# Patient Record
Sex: MALE | Race: OTHER | Employment: FULL TIME | URBAN - METROPOLITAN AREA
[De-identification: names, ages, dates, MRNs, and addresses within clinical notes are randomized per-mention and may not be internally consistent; named-entity substitution may affect disease eponyms.]

---

## 2021-10-25 ENCOUNTER — HOSPITAL ENCOUNTER (INPATIENT)
Facility: HOSPITAL | Age: 61
LOS: 46 days | Discharge: HOME | DRG: 492 | End: 2021-12-10
Attending: EMERGENCY MEDICINE | Admitting: HOSPITALIST
Payer: OTHER MISCELLANEOUS

## 2021-10-25 ENCOUNTER — ANESTHESIA EVENT (OUTPATIENT)
Dept: SURGERY | Facility: HOSPITAL | Age: 61
DRG: 492 | End: 2021-10-25
Payer: OTHER MISCELLANEOUS

## 2021-10-25 ENCOUNTER — APPOINTMENT (OUTPATIENT)
Dept: GENERAL RADIOLOGY | Facility: HOSPITAL | Age: 61
DRG: 492 | End: 2021-10-25
Attending: EMERGENCY MEDICINE
Payer: OTHER MISCELLANEOUS

## 2021-10-25 ENCOUNTER — ANESTHESIA (OUTPATIENT)
Dept: SURGERY | Facility: HOSPITAL | Age: 61
DRG: 492 | End: 2021-10-25
Payer: OTHER MISCELLANEOUS

## 2021-10-25 ENCOUNTER — APPOINTMENT (OUTPATIENT)
Dept: GENERAL RADIOLOGY | Facility: HOSPITAL | Age: 61
DRG: 492 | End: 2021-10-25
Attending: ORTHOPAEDIC SURGERY
Payer: OTHER MISCELLANEOUS

## 2021-10-25 DIAGNOSIS — S82.842B TYPE I OR II OPEN BIMALLEOLAR FRACTURE OF LEFT ANKLE, INITIAL ENCOUNTER: Primary | ICD-10-CM

## 2021-10-25 DIAGNOSIS — S82.841B: ICD-10-CM

## 2021-10-25 PROBLEM — S82.899B OPEN ANKLE FRACTURE: Status: ACTIVE | Noted: 2021-10-25

## 2021-10-25 PROCEDURE — 71045 X-RAY EXAM CHEST 1 VIEW: CPT | Performed by: EMERGENCY MEDICINE

## 2021-10-25 PROCEDURE — 0QSJ04Z REPOSITION RIGHT FIBULA WITH INTERNAL FIXATION DEVICE, OPEN APPROACH: ICD-10-PCS | Performed by: ORTHOPAEDIC SURGERY

## 2021-10-25 PROCEDURE — 99222 1ST HOSP IP/OBS MODERATE 55: CPT | Performed by: HOSPITALIST

## 2021-10-25 PROCEDURE — B41F1ZZ FLUOROSCOPY OF RIGHT LOWER EXTREMITY ARTERIES USING LOW OSMOLAR CONTRAST: ICD-10-PCS | Performed by: ORTHOPAEDIC SURGERY

## 2021-10-25 PROCEDURE — 73600 X-RAY EXAM OF ANKLE: CPT | Performed by: EMERGENCY MEDICINE

## 2021-10-25 PROCEDURE — 76000 FLUOROSCOPY <1 HR PHYS/QHP: CPT | Performed by: ORTHOPAEDIC SURGERY

## 2021-10-25 PROCEDURE — 73610 X-RAY EXAM OF ANKLE: CPT | Performed by: EMERGENCY MEDICINE

## 2021-10-25 DEVICE — IMPLANTABLE DEVICE: Type: IMPLANTABLE DEVICE | Site: ANKLE | Status: FUNCTIONAL

## 2021-10-25 DEVICE — ROD CARBON 11X400 394.87: Type: IMPLANTABLE DEVICE | Site: ANKLE | Status: FUNCTIONAL

## 2021-10-25 DEVICE — CLAMP SYNT LG MRI 390.005: Type: IMPLANTABLE DEVICE | Site: ANKLE | Status: FUNCTIONAL

## 2021-10-25 RX ORDER — EPHEDRINE SULFATE 50 MG/ML
INJECTION, SOLUTION INTRAVENOUS AS NEEDED
Status: DISCONTINUED | OUTPATIENT
Start: 2021-10-25 | End: 2021-10-25 | Stop reason: SURG

## 2021-10-25 RX ORDER — PROCHLORPERAZINE EDISYLATE 5 MG/ML
10 INJECTION INTRAMUSCULAR; INTRAVENOUS EVERY 6 HOURS PRN
Status: ACTIVE | OUTPATIENT
Start: 2021-10-25 | End: 2021-10-27

## 2021-10-25 RX ORDER — POLYETHYLENE GLYCOL 3350 17 G/17G
17 POWDER, FOR SOLUTION ORAL DAILY PRN
Status: DISCONTINUED | OUTPATIENT
Start: 2021-10-25 | End: 2021-12-10

## 2021-10-25 RX ORDER — CEFAZOLIN SODIUM/WATER 2 G/20 ML
2 SYRINGE (ML) INTRAVENOUS EVERY 8 HOURS
Status: COMPLETED | OUTPATIENT
Start: 2021-10-26 | End: 2021-10-26

## 2021-10-25 RX ORDER — SODIUM CHLORIDE, SODIUM LACTATE, POTASSIUM CHLORIDE, CALCIUM CHLORIDE 600; 310; 30; 20 MG/100ML; MG/100ML; MG/100ML; MG/100ML
INJECTION, SOLUTION INTRAVENOUS CONTINUOUS
Status: DISCONTINUED | OUTPATIENT
Start: 2021-10-25 | End: 2021-10-28

## 2021-10-25 RX ORDER — MORPHINE SULFATE 2 MG/ML
2 INJECTION, SOLUTION INTRAMUSCULAR; INTRAVENOUS EVERY 2 HOUR PRN
Status: DISCONTINUED | OUTPATIENT
Start: 2021-10-25 | End: 2021-10-25

## 2021-10-25 RX ORDER — PROCHLORPERAZINE EDISYLATE 5 MG/ML
5 INJECTION INTRAMUSCULAR; INTRAVENOUS ONCE AS NEEDED
Status: DISCONTINUED | OUTPATIENT
Start: 2021-10-25 | End: 2021-10-25 | Stop reason: HOSPADM

## 2021-10-25 RX ORDER — MORPHINE SULFATE 4 MG/ML
4 INJECTION, SOLUTION INTRAMUSCULAR; INTRAVENOUS EVERY 2 HOUR PRN
Status: DISCONTINUED | OUTPATIENT
Start: 2021-10-25 | End: 2021-10-25

## 2021-10-25 RX ORDER — DOXEPIN HYDROCHLORIDE 50 MG/1
1 CAPSULE ORAL DAILY
Status: DISCONTINUED | OUTPATIENT
Start: 2021-10-26 | End: 2021-10-31

## 2021-10-25 RX ORDER — HYDROCODONE BITARTRATE AND ACETAMINOPHEN 5; 325 MG/1; MG/1
2 TABLET ORAL AS NEEDED
Status: DISCONTINUED | OUTPATIENT
Start: 2021-10-25 | End: 2021-10-25 | Stop reason: HOSPADM

## 2021-10-25 RX ORDER — ONDANSETRON 2 MG/ML
INJECTION INTRAMUSCULAR; INTRAVENOUS AS NEEDED
Status: DISCONTINUED | OUTPATIENT
Start: 2021-10-25 | End: 2021-10-25 | Stop reason: SURG

## 2021-10-25 RX ORDER — HYDROMORPHONE HYDROCHLORIDE 1 MG/ML
0.2 INJECTION, SOLUTION INTRAMUSCULAR; INTRAVENOUS; SUBCUTANEOUS EVERY 5 MIN PRN
Status: DISCONTINUED | OUTPATIENT
Start: 2021-10-25 | End: 2021-10-25 | Stop reason: HOSPADM

## 2021-10-25 RX ORDER — SODIUM PHOSPHATE, DIBASIC AND SODIUM PHOSPHATE, MONOBASIC 7; 19 G/133ML; G/133ML
1 ENEMA RECTAL ONCE AS NEEDED
Status: DISCONTINUED | OUTPATIENT
Start: 2021-10-25 | End: 2021-12-10

## 2021-10-25 RX ORDER — HALOPERIDOL 5 MG/ML
0.25 INJECTION INTRAMUSCULAR ONCE AS NEEDED
Status: DISCONTINUED | OUTPATIENT
Start: 2021-10-25 | End: 2021-10-25 | Stop reason: HOSPADM

## 2021-10-25 RX ORDER — HYDROMORPHONE HYDROCHLORIDE 1 MG/ML
0.4 INJECTION, SOLUTION INTRAMUSCULAR; INTRAVENOUS; SUBCUTANEOUS EVERY 5 MIN PRN
Status: DISCONTINUED | OUTPATIENT
Start: 2021-10-25 | End: 2021-10-25 | Stop reason: HOSPADM

## 2021-10-25 RX ORDER — HYDROMORPHONE HYDROCHLORIDE 1 MG/ML
0.6 INJECTION, SOLUTION INTRAMUSCULAR; INTRAVENOUS; SUBCUTANEOUS EVERY 5 MIN PRN
Status: DISCONTINUED | OUTPATIENT
Start: 2021-10-25 | End: 2021-10-25 | Stop reason: HOSPADM

## 2021-10-25 RX ORDER — MORPHINE SULFATE 4 MG/ML
4 INJECTION, SOLUTION INTRAMUSCULAR; INTRAVENOUS EVERY 10 MIN PRN
Status: DISCONTINUED | OUTPATIENT
Start: 2021-10-25 | End: 2021-10-25 | Stop reason: HOSPADM

## 2021-10-25 RX ORDER — ONDANSETRON 2 MG/ML
4 INJECTION INTRAMUSCULAR; INTRAVENOUS ONCE AS NEEDED
Status: DISCONTINUED | OUTPATIENT
Start: 2021-10-25 | End: 2021-10-25 | Stop reason: HOSPADM

## 2021-10-25 RX ORDER — MORPHINE SULFATE 2 MG/ML
1 INJECTION, SOLUTION INTRAMUSCULAR; INTRAVENOUS EVERY 2 HOUR PRN
Status: DISCONTINUED | OUTPATIENT
Start: 2021-10-25 | End: 2021-12-10

## 2021-10-25 RX ORDER — MORPHINE SULFATE 4 MG/ML
4 INJECTION, SOLUTION INTRAMUSCULAR; INTRAVENOUS ONCE
Status: COMPLETED | OUTPATIENT
Start: 2021-10-25 | End: 2021-10-25

## 2021-10-25 RX ORDER — HYDROCODONE BITARTRATE AND ACETAMINOPHEN 5; 325 MG/1; MG/1
1 TABLET ORAL EVERY 4 HOURS PRN
Status: DISCONTINUED | OUTPATIENT
Start: 2021-10-25 | End: 2021-12-10

## 2021-10-25 RX ORDER — ASPIRIN 325 MG
325 TABLET ORAL 2 TIMES DAILY
Status: DISCONTINUED | OUTPATIENT
Start: 2021-10-25 | End: 2021-10-28

## 2021-10-25 RX ORDER — DEXAMETHASONE SODIUM PHOSPHATE 4 MG/ML
VIAL (ML) INJECTION AS NEEDED
Status: DISCONTINUED | OUTPATIENT
Start: 2021-10-25 | End: 2021-10-25 | Stop reason: SURG

## 2021-10-25 RX ORDER — BISACODYL 10 MG
10 SUPPOSITORY, RECTAL RECTAL
Status: DISCONTINUED | OUTPATIENT
Start: 2021-10-25 | End: 2021-12-10

## 2021-10-25 RX ORDER — NALOXONE HYDROCHLORIDE 0.4 MG/ML
80 INJECTION, SOLUTION INTRAMUSCULAR; INTRAVENOUS; SUBCUTANEOUS AS NEEDED
Status: DISCONTINUED | OUTPATIENT
Start: 2021-10-25 | End: 2021-10-25 | Stop reason: HOSPADM

## 2021-10-25 RX ORDER — MORPHINE SULFATE 4 MG/ML
2 INJECTION, SOLUTION INTRAMUSCULAR; INTRAVENOUS EVERY 10 MIN PRN
Status: DISCONTINUED | OUTPATIENT
Start: 2021-10-25 | End: 2021-10-25 | Stop reason: HOSPADM

## 2021-10-25 RX ORDER — ONDANSETRON 2 MG/ML
4 INJECTION INTRAMUSCULAR; INTRAVENOUS EVERY 4 HOURS PRN
Status: ACTIVE | OUTPATIENT
Start: 2021-10-25 | End: 2021-10-27

## 2021-10-25 RX ORDER — LIDOCAINE HYDROCHLORIDE 40 MG/ML
SOLUTION TOPICAL AS NEEDED
Status: DISCONTINUED | OUTPATIENT
Start: 2021-10-25 | End: 2021-10-25 | Stop reason: SURG

## 2021-10-25 RX ORDER — CEFAZOLIN SODIUM/WATER 2 G/20 ML
2 SYRINGE (ML) INTRAVENOUS ONCE
Status: COMPLETED | OUTPATIENT
Start: 2021-10-25 | End: 2021-10-25

## 2021-10-25 RX ORDER — SODIUM CHLORIDE, SODIUM LACTATE, POTASSIUM CHLORIDE, CALCIUM CHLORIDE 600; 310; 30; 20 MG/100ML; MG/100ML; MG/100ML; MG/100ML
INJECTION, SOLUTION INTRAVENOUS CONTINUOUS PRN
Status: DISCONTINUED | OUTPATIENT
Start: 2021-10-25 | End: 2021-10-25 | Stop reason: SURG

## 2021-10-25 RX ORDER — LIDOCAINE HYDROCHLORIDE 10 MG/ML
INJECTION, SOLUTION EPIDURAL; INFILTRATION; INTRACAUDAL; PERINEURAL AS NEEDED
Status: DISCONTINUED | OUTPATIENT
Start: 2021-10-25 | End: 2021-10-25 | Stop reason: SURG

## 2021-10-25 RX ORDER — HYDROMORPHONE HYDROCHLORIDE 1 MG/ML
0.4 INJECTION, SOLUTION INTRAMUSCULAR; INTRAVENOUS; SUBCUTANEOUS EVERY 2 HOUR PRN
Status: DISCONTINUED | OUTPATIENT
Start: 2021-10-25 | End: 2021-11-20

## 2021-10-25 RX ORDER — MORPHINE SULFATE 4 MG/ML
INJECTION, SOLUTION INTRAMUSCULAR; INTRAVENOUS
Status: COMPLETED
Start: 2021-10-25 | End: 2021-10-25

## 2021-10-25 RX ORDER — CELECOXIB 200 MG/1
200 CAPSULE ORAL 2 TIMES DAILY
Status: DISCONTINUED | OUTPATIENT
Start: 2021-10-25 | End: 2021-10-28

## 2021-10-25 RX ORDER — DOCUSATE SODIUM 100 MG/1
100 CAPSULE, LIQUID FILLED ORAL 2 TIMES DAILY
Status: DISCONTINUED | OUTPATIENT
Start: 2021-10-25 | End: 2021-11-18

## 2021-10-25 RX ORDER — HYDROCODONE BITARTRATE AND ACETAMINOPHEN 10; 325 MG/1; MG/1
1 TABLET ORAL EVERY 4 HOURS PRN
Status: DISCONTINUED | OUTPATIENT
Start: 2021-10-25 | End: 2021-12-10

## 2021-10-25 RX ORDER — HYDROCODONE BITARTRATE AND ACETAMINOPHEN 5; 325 MG/1; MG/1
1 TABLET ORAL AS NEEDED
Status: DISCONTINUED | OUTPATIENT
Start: 2021-10-25 | End: 2021-10-25 | Stop reason: HOSPADM

## 2021-10-25 RX ORDER — MELATONIN
3 NIGHTLY PRN
Status: DISCONTINUED | OUTPATIENT
Start: 2021-10-25 | End: 2021-12-10

## 2021-10-25 RX ORDER — BUPIVACAINE HYDROCHLORIDE 2.5 MG/ML
20 INJECTION, SOLUTION EPIDURAL; INFILTRATION; INTRACAUDAL ONCE
Status: COMPLETED | OUTPATIENT
Start: 2021-10-25 | End: 2021-10-25

## 2021-10-25 RX ORDER — HYDROMORPHONE HYDROCHLORIDE 1 MG/ML
0.8 INJECTION, SOLUTION INTRAMUSCULAR; INTRAVENOUS; SUBCUTANEOUS EVERY 2 HOUR PRN
Status: DISCONTINUED | OUTPATIENT
Start: 2021-10-25 | End: 2021-11-20

## 2021-10-25 RX ORDER — SODIUM CHLORIDE, SODIUM LACTATE, POTASSIUM CHLORIDE, CALCIUM CHLORIDE 600; 310; 30; 20 MG/100ML; MG/100ML; MG/100ML; MG/100ML
INJECTION, SOLUTION INTRAVENOUS CONTINUOUS
Status: DISCONTINUED | OUTPATIENT
Start: 2021-10-25 | End: 2021-10-25 | Stop reason: HOSPADM

## 2021-10-25 RX ORDER — MIDAZOLAM HYDROCHLORIDE 1 MG/ML
INJECTION INTRAMUSCULAR; INTRAVENOUS AS NEEDED
Status: DISCONTINUED | OUTPATIENT
Start: 2021-10-25 | End: 2021-10-25 | Stop reason: SURG

## 2021-10-25 RX ORDER — MORPHINE SULFATE 10 MG/ML
6 INJECTION, SOLUTION INTRAMUSCULAR; INTRAVENOUS EVERY 10 MIN PRN
Status: DISCONTINUED | OUTPATIENT
Start: 2021-10-25 | End: 2021-10-25 | Stop reason: HOSPADM

## 2021-10-25 RX ADMIN — MIDAZOLAM HYDROCHLORIDE 2 MG: 1 INJECTION INTRAMUSCULAR; INTRAVENOUS at 17:19:00

## 2021-10-25 RX ADMIN — LIDOCAINE HYDROCHLORIDE 50 MG: 10 INJECTION, SOLUTION EPIDURAL; INFILTRATION; INTRACAUDAL; PERINEURAL at 17:19:00

## 2021-10-25 RX ADMIN — CEFAZOLIN SODIUM/WATER 2 G: 2 G/20 ML SYRINGE (ML) INTRAVENOUS at 17:35:00

## 2021-10-25 RX ADMIN — ONDANSETRON 4 MG: 2 INJECTION INTRAMUSCULAR; INTRAVENOUS at 17:19:00

## 2021-10-25 RX ADMIN — EPHEDRINE SULFATE 10 MG: 50 INJECTION, SOLUTION INTRAVENOUS at 18:15:00

## 2021-10-25 RX ADMIN — DEXAMETHASONE SODIUM PHOSPHATE 4 MG: 4 MG/ML VIAL (ML) INJECTION at 17:19:00

## 2021-10-25 RX ADMIN — LIDOCAINE HYDROCHLORIDE 4 ML: 40 SOLUTION TOPICAL at 17:19:00

## 2021-10-25 RX ADMIN — SODIUM CHLORIDE, SODIUM LACTATE, POTASSIUM CHLORIDE, CALCIUM CHLORIDE: 600; 310; 30; 20 INJECTION, SOLUTION INTRAVENOUS at 17:19:00

## 2021-10-25 NOTE — ANESTHESIA POSTPROCEDURE EVALUATION
Patient: Julieta Durand    Procedure Summary     Date: 10/25/21 Room / Location: [address] [place] [address] MAIN OR 01 / 300 [facility] MAIN OR    Anesthesia Start: 1924 Anesthesia Stop: 5602    Procedure: RIGHT ANKLE OPEN FRACTURE IRRIGATION & DEBRIDEMENT, PLACEMENT EXTERNAL [facility]ATOR ([facility]

## 2021-10-25 NOTE — ANESTHESIA PREPROCEDURE EVALUATION
Anesthesia PreOp Note    HPI:     Marleen Schumacher is a 62year old male who presents for preoperative consultation requested by: Opal Graham MD    Date of Surgery: 10/25/2021    Procedure(s):  RIGHT ANKLE OPEN FRACTURE IRRIGATION & DEBRIDEMENT, FLO Running Out of Food in the Last Year: Not on file      Ran Out of Food in the Last Year: Not on file  Transportation Needs:       Lack of Transportation (Medical): Not on file      Lack of Transportation (Non-Medical):  Not on file  Physical Activity:       BP: 153/88 149/90 142/76   Pulse: 109 98 97   Resp: 21 20 20   Temp: 98.2 °F (36.8 °C)     SpO2: 100% 98% 99%   Weight: 81.2 kg (179 lb)     Height: 1.778 m (5' 10\")          Anesthesia Evaluation     Patient summary reviewed and Nursing notes reviewed

## 2021-10-25 NOTE — OPERATIVE REPORT
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   He sustained a right ankle grade 1 open fracture dislocation was brought to the emergency department for further evalu and passed the guidepin all the way across the calcaneus. Next I placed 2 proximal pins from anterior to posterior in the tibial shaft first drilling after making small stab incisions and then passing the pins.   I secured the pins to 2 carbon fiber rods w

## 2021-10-25 NOTE — ANESTHESIA PROCEDURE NOTES
Airway  Date/Time: 10/25/2021 5:21 PM  Urgency: Elective    Airway not difficult    General Information and Staff    Patient location during procedure: OR  Anesthesiologist: Helena Mcwilliams MD  Performed: anesthesiologist     Indications and Patient Conditi

## 2021-10-25 NOTE — H&P
[facility]    PATIENT'S NAME: Keshav Syed   ATTENDING PHYSICIAN: Bria Abarca MD   PATIENT ACCOUNT#:   [de-identified]    LOCATION:  [place] [address]  MEDICAL RECORD #:   [id number]       YOB: 1960  ADMISSION DATE:       10/25/20 PHYSICAL EXAMINATION:    GENERAL:  Alert and oriented to time, place, and person. Moderate distress. VITAL SIGNS:  Temperature 98.2, pulse 109, respiratory rate 21, blood pressure 153/88, pulse ox 100% on room air. HEENT:  Atraumatic.   Orophary

## 2021-10-25 NOTE — CONSULTS
[facility] HOSP - [facility]    Report of Consultation    Samreen Wade Patient Status:  Emergency    1960 MRN [id number]   Location [place] [address] Attending Shimon Nguyễn MD   Hosp Day # 0 PCP None Pcp     Date of Admission pressure 142/76, pulse 97, temperature 98.2 °F (36.8 °C), resp. rate 20, height 5' 10\" (1.778 m), weight 179 lb (81.2 kg), SpO2 99 %.     General appearance: alert and oriented, not in acute distress  Head and neck: Atraumatic, normocephalic, PERRLA  Respi Impression:     Bimalleolar fracture of right ankle, open type I or II, initial encounter          Recommendations:   At this point I recommended right open ankle fracture irrigation and debridement and lateral malleolus fracture ORIF versus ankle ext

## 2021-10-25 NOTE — ED PROVIDER NOTES
Patient Seen in: [facility] AND [facility] Emergency Department      History   Patient presents with:  Trauma    Stated Complaint:     Subjective:   HPI    42-year-old male without significant past medical history presents with complaints of right ankle injury. lungs are clear to auscultation  Cardiovascular: regular rate and rhythm  Gastrointestinal:  abdomen is soft and non tender, no masses, bowel sounds normal  Neurological: Speech normal. Motor and sensation is intact and symmetric to bilateral lower extremi Final result                 Please view results for these tests on the individual orders. SCAN SLIDE   TYPE AND SCREEN    Narrative: The following orders were created for panel order Type and screen.   Procedure                               Ab [phone]    Schedule an appointment as soon as possible for a visit in 3 weeks  Plan for 2nd surgery on Thursday 11/4/21 at the [facility] main operating room          Medications Prescribed:  There are no discharge medications for this patient.

## 2021-10-25 NOTE — ED QUICK NOTES
Orders for admission, patient is aware of plan and ready to go upstairs.  Any questions, please call ED EDNA gaston at extension [phone]  Type of COVID test sent:rapid  COVID Suspicion level: Low    Titratable drug(s) infusing: n/a  Rate:    LOC at time of mcneal

## 2021-10-25 NOTE — ED INITIAL ASSESSMENT (HPI)
Pt arrived via EMS for visible deformity and opening to right ankle. Pt was stepping off dock at work and twisted his ankle upon landing. Denies pain elsewhere or injury to head. Pt is AAOx4. Bleeding is controlled. Split applied per EMS.    81 mcg Fetanyl

## 2021-10-26 ENCOUNTER — TELEPHONE (OUTPATIENT)
Dept: ORTHOPEDICS CLINIC | Facility: CLINIC | Age: 61
End: 2021-10-26

## 2021-10-26 DIAGNOSIS — S82.841B: Primary | ICD-10-CM

## 2021-10-26 PROCEDURE — 99233 SBSQ HOSP IP/OBS HIGH 50: CPT | Performed by: HOSPITALIST

## 2021-10-26 RX ORDER — HYDRALAZINE HYDROCHLORIDE 20 MG/ML
10 INJECTION INTRAMUSCULAR; INTRAVENOUS EVERY 4 HOURS PRN
Status: DISCONTINUED | OUTPATIENT
Start: 2021-10-26 | End: 2021-12-10

## 2021-10-26 RX ORDER — LACTULOSE 20 G/30ML
30 SOLUTION ORAL 3 TIMES DAILY
COMMUNITY
End: 2021-12-10

## 2021-10-26 RX ORDER — TAMSULOSIN HYDROCHLORIDE 0.4 MG/1
CAPSULE ORAL DAILY
COMMUNITY

## 2021-10-26 RX ORDER — TELMISARTAN 20 MG/1
20 TABLET ORAL DAILY
COMMUNITY

## 2021-10-26 RX ORDER — ZOLPIDEM TARTRATE 10 MG/1
10 TABLET ORAL NIGHTLY PRN
COMMUNITY
End: 2021-12-10

## 2021-10-26 RX ORDER — TIZANIDINE 4 MG/1
4 TABLET ORAL NIGHTLY PRN
COMMUNITY
End: 2021-12-10

## 2021-10-26 RX ORDER — NEBIVOLOL 10 MG/1
10 TABLET ORAL DAILY
COMMUNITY

## 2021-10-26 RX ORDER — TAMSULOSIN HYDROCHLORIDE 0.4 MG/1
0.4 CAPSULE ORAL DAILY
Status: DISCONTINUED | OUTPATIENT
Start: 2021-10-26 | End: 2021-12-10

## 2021-10-26 RX ORDER — ACETAMINOPHEN 325 MG/1
650 TABLET ORAL EVERY 6 HOURS PRN
Status: DISCONTINUED | OUTPATIENT
Start: 2021-10-26 | End: 2021-12-10

## 2021-10-26 RX ORDER — LOSARTAN POTASSIUM 25 MG/1
25 TABLET ORAL DAILY
Status: DISCONTINUED | OUTPATIENT
Start: 2021-10-26 | End: 2021-10-30

## 2021-10-26 NOTE — PROGRESS NOTES
[facility]    Progress Note    Deirdre Sierraford Patient Status:  Inpatient    1960 MRN [id number]   Location [facility] 4W/SW/SE Attending Rivera Sultana MD   Hosp Day # 1 PCP None Pcp        Subjective:   Inez Kanner is a(n bimalleolar fracture of left ankle, initial encounter        Open ankle fracture              Results:     Lab Results   Component Value Date    WBC 4.6 10/26/2021    HGB 12.4 (L) 10/26/2021    HCT 35.4 (L) 10/26/2021    PLT 90.0 (L) 10/26/2021    ARACELY

## 2021-10-26 NOTE — CM/SW NOTE
10/26/21 1600   CM/SW Referral Data   Referral Source Social Work (self-referral)   Reason for Referral Discharge planning   Informant Patient   Patient Info   Patient's Current Mental Status at Time of Assessment Alert;Oriented   Patient's Home Environ

## 2021-10-26 NOTE — PLAN OF CARE
From home with family.  Left ankle with fixator elevated up on pillow and got up with therapy to chair nonweightbearing with walker maintained; serosanguinous drainage noted to posterior bandage small amount, swelling +3 to left foot, toes mobile and warm a pain and evaluate response  - Implement non-pharmacological measures as appropriate and evaluate response  - Consider cultural and social influences on pain and pain management  - Manage/alleviate anxiety  - Utilize distraction and/or relaxation techniques responsible for managing their own health  - Refer to Case Management Department for coordinating discharge planning if the patient needs post-hospital services based on physician/LIP order or complex needs related to functional status, cognitive ability o

## 2021-10-26 NOTE — PHYSICAL THERAPY NOTE
PHYSICAL THERAPY EVALUATION - INPATIENT     Room Number: 431/431-A  Evaluation Date: 10/26/2021  Type of Evaluation: Initial   Physician Order: PT Eval and Treat    Presenting Problem:  bimalleolar fracture of R ankle- requiring R ankle open fracture irri LE's elevated on pillows, needs in reach. The patient's Approx Degree of Impairment: 50.57% has been calculated based on documentation in the [facility] '6 clicks' Inpatient Basic Mobility Short Form.   Research supports that patients with this level of impa level  Stairs to Enter : 2  Railing: Yes          Lives With: Son;Spouse  Drives: Yes  Patient Owned Equipment: Crutches (can borrow son's crutches)  Patient Regularly Uses: None    Prior Level of Boyd: Ind in ADL's and IADL's +driving +working FT A Lot     AM-PAC Score:  Raw Score: 17   Approx Degree of Impairment: 50.57%   Standardized Score (AM-PAC Scale): 42.13   CMS Modifier (G-Code): CK    FUNCTIONAL ABILITY STATUS  Gait Assessment   Gait Assistance: Contact guard assist;Minimum assistance  Maren Reeder

## 2021-10-26 NOTE — TELEPHONE ENCOUNTER
Type of surgery:  Right ankle fracture external fixation removal, and open reduction/internal fixation  Date:  11/4/21  Location: [facility]  Medical Clearance:      *Medical: No      *Dental: No      *Other:  Prior Authorization Status: Pending  Workers Comp:  M

## 2021-10-26 NOTE — PROGRESS NOTES
Shakiness noted sitting in chair. Patient unsure of home medication name and doseage. Primary Care Provider is Dr Rosy Conteh at [phone], called for med list; being faxed now. [phone]- C/O headache.

## 2021-10-26 NOTE — PHYSICAL THERAPY NOTE
PHYSICAL THERAPY TREATMENT NOTE - INPATIENT     Room Number: 431/431-A       Presenting Problem:  bimalleolar fracture of R ankle- requiring R ankle open fracture irrigation and debridement with placement of external fixator on 10/25/21    Problem List  Pr training    SUBJECTIVE  \"my family will be here later\"    OBJECTIVE  Precautions: Limb alert - right    WEIGHT BEARING RESTRICTION  Weight Bearing Restriction: R lower extremity        R Lower Extremity: Non-Weight Bearing       PAIN ASSESSMENT   Rating: Session: In bed;Needs met;Call light within reach;RN aware of session/findings    CURRENT GOALS   Goals to be met by: 11/2/21  Patient Goal Patient's self-stated goal is: return to PLOF   Goal #1 Patient is able to demonstrate supine - sit EOB @ level: ind

## 2021-10-26 NOTE — PROGRESS NOTES
[facility]  Hospitalist Progress Note     Keya Lal Patient Status:  Inpatient    1960  62year old [facility] [id number]   Location 431/431-A Attending Kartik Mcclure MD   Hosp Day # 1 PCP None Pcp     Assessment & Plan:   ----------- 10/25/21  1400 10/26/21  0525   * 123*   BUN 5* 8   CREATSERUM 0.72 0.77   GFRAA 117 114   GFRNAA 101 99   CA 8.8 8.6    135*   K 3.3* 4.2    106   CO2 21.0 23.0     No results for input(s): PT, INR, PTT in the last 168 hours.     • aminta

## 2021-10-26 NOTE — OCCUPATIONAL THERAPY NOTE
OCCUPATIONAL THERAPY EVALUATION - INPATIENT      Room Number: 431/431-A  Evaluation Date: 10/26/2021  Type of Evaluation: Initial       Physician Order: IP Consult to Occupational Therapy  Reason for Therapy: ADL/IADL Dysfunction and Discharge Planning seat;Grab bars; Shower chair    PLAN  OT Treatment Plan: Balance activities; Energy conservation/work simplification techniques;ADL training;Functional transfer training; Endurance training;Patient/Family education;Patient/Family training; Compensatory techniq need…  -   Putting on and taking off regular lower body clothing?: A Lot  -   Bathing (including washing, rinsing, drying)?: A Lot  -   Toileting, which includes using toilet, bedpan or urinal? : A Lot  -   Putting on and taking off regular upper body clot

## 2021-10-26 NOTE — PLAN OF CARE
Pt is ao x 4, pt is resting on room air, pt has r ankle elevated, pt is voiding via urinal, pt not oob yet, call light next to pt, pt able to wiggle toes, foot is warm, pt will be NWB to RLE when gets oob    Problem: PAIN - ADULT  Goal: Verbalizes/displays resources  Description: INTERVENTIONS:  - Identify barriers to discharge w/pt and caregiver  - Include patient/family/discharge partner in discharge planning  - Arrange for needed discharge resources and transportation as appropriate  - Identify discharge

## 2021-10-27 PROCEDURE — 99291 CRITICAL CARE FIRST HOUR: CPT | Performed by: HOSPITALIST

## 2021-10-27 PROCEDURE — HZ2ZZZZ DETOXIFICATION SERVICES FOR SUBSTANCE ABUSE TREATMENT: ICD-10-PCS | Performed by: HOSPITALIST

## 2021-10-27 RX ORDER — LORAZEPAM 2 MG/ML
2 INJECTION INTRAMUSCULAR
Status: DISCONTINUED | OUTPATIENT
Start: 2021-10-27 | End: 2021-11-20

## 2021-10-27 RX ORDER — MELATONIN
100 DAILY
Status: DISCONTINUED | OUTPATIENT
Start: 2021-10-28 | End: 2021-11-13

## 2021-10-27 RX ORDER — LORAZEPAM 1 MG/1
2 TABLET ORAL
Status: DISCONTINUED | OUTPATIENT
Start: 2021-10-27 | End: 2021-11-20

## 2021-10-27 RX ORDER — ASPIRIN 325 MG
325 TABLET ORAL 2 TIMES DAILY
Qty: 60 TABLET | Refills: 0 | Status: SHIPPED | OUTPATIENT
Start: 2021-10-27

## 2021-10-27 RX ORDER — SENNA AND DOCUSATE SODIUM 50; 8.6 MG/1; MG/1
2 TABLET, FILM COATED ORAL DAILY
Qty: 60 TABLET | Refills: 0 | Status: SHIPPED | OUTPATIENT
Start: 2021-10-27

## 2021-10-27 RX ORDER — LORAZEPAM 2 MG/ML
1 INJECTION INTRAMUSCULAR ONCE
Status: DISCONTINUED | OUTPATIENT
Start: 2021-10-27 | End: 2021-11-20

## 2021-10-27 RX ORDER — LORAZEPAM 2 MG/ML
INJECTION INTRAMUSCULAR
Status: DISPENSED
Start: 2021-10-27 | End: 2021-10-28

## 2021-10-27 RX ORDER — HYDROCODONE BITARTRATE AND ACETAMINOPHEN 5; 325 MG/1; MG/1
1 TABLET ORAL EVERY 4 HOURS PRN
Qty: 30 TABLET | Refills: 0 | Status: SHIPPED | OUTPATIENT
Start: 2021-10-27

## 2021-10-27 RX ORDER — LORAZEPAM 2 MG/ML
1 INJECTION INTRAMUSCULAR ONCE
Status: COMPLETED | OUTPATIENT
Start: 2021-10-27 | End: 2021-10-27

## 2021-10-27 RX ORDER — LORAZEPAM 2 MG/ML
1 INJECTION INTRAMUSCULAR
Status: DISCONTINUED | OUTPATIENT
Start: 2021-10-27 | End: 2021-11-20

## 2021-10-27 RX ORDER — LORAZEPAM 1 MG/1
1 TABLET ORAL
Status: DISCONTINUED | OUTPATIENT
Start: 2021-10-27 | End: 2021-11-20

## 2021-10-27 RX ORDER — FOLIC ACID 1 MG/1
1 TABLET ORAL DAILY
Status: DISCONTINUED | OUTPATIENT
Start: 2021-10-27 | End: 2021-12-10

## 2021-10-27 RX ORDER — LORAZEPAM 2 MG/ML
INJECTION INTRAMUSCULAR
Status: COMPLETED
Start: 2021-10-27 | End: 2021-10-27

## 2021-10-27 RX ORDER — DOXEPIN HYDROCHLORIDE 50 MG/1
1 CAPSULE ORAL DAILY
Qty: 30 TABLET | Refills: 0 | Status: SHIPPED | OUTPATIENT
Start: 2021-10-28 | End: 2021-12-12

## 2021-10-27 RX ORDER — MULTIPLE VITAMINS W/ MINERALS TAB 9MG-400MCG
1 TAB ORAL DAILY
Status: DISCONTINUED | OUTPATIENT
Start: 2021-10-27 | End: 2021-12-10

## 2021-10-27 NOTE — OCCUPATIONAL THERAPY NOTE
OCCUPATIONAL THERAPY TREATMENT NOTE - INPATIENT    Room Number: 431/431-A               Problem List  Principal Problem:    Bimalleolar fracture of right ankle, open type I or II, initial encounter  Active Problems:    Type I or II open bimalleolar fractur return next week for followup surgery on RLE, recommending pt return to home with family assist until then. Elidia Round O     DISCHARGE RECOMMENDATIONS  OT Discharge Recommendations: 24 hour care/supervision;Home (with plans of return for surgery next week )  OT Device breaks, cues for safety with RW placement and NWB status of RLE    BALANCE ASSESSMENT  Static Sitting: good   Dynamic Sitting: good   Static Standing: fair   Dynamic Standing: fair-    FUNCTIONAL ADL ASSESSMENT  Grooming: set up and pt seated   Bathing: NT

## 2021-10-27 NOTE — PROGRESS NOTES
[facility] HOSP - [facility]  Hospitalist Progress Note     Nurys Aguilera Patient Status:  Inpatient    1960  62year old [facility] [id number]   Location 431/431-A Attending Suad Rivero MD   Hosp Day # 2 PCP None Pcp     Assessment & Plan:   ----------- 10/26/21  0525   RBC 4.00* 3.57*   HGB 13.7 12.4*   HCT 39.4 35.4*   MCV 98.5 99.2   MCH 34.3* 34.7*   MCHC 34.8 35.0   RDW 13.9 13.5   NEPRELIM 1.83 3.67   WBC 4.0 4.6   PLT 91.0* 90.0*     Recent Labs   Lab 10/25/21  1400 10/26/21  0525   * 123*

## 2021-10-27 NOTE — CONSULTS
[address] CONSULT NOTE    Tawnya Posadas Patient Status:  Inpatient    1960 MRN [id number]   Location [facility] 4W/SW/SE Attending Vashti Lilly MD   Hosp Day # 2 PCP None Pcp     Date of Admission:  10/25/20 prostatic hyperplasia)    • Cancer [facility])    • Disorder of liver    • Esophageal varices (HCC)     without bleeding   • Former smoker    • Heavy drinker of alcohol    • Hepatic encephalopathy ([facility]    • Portal hypertension ([facility] [address]    • Renal cancer, left ([facility] [address] injection 1 mg, 1 mg, Intravenous, Q2H PRN  docusate sodium (COLACE) cap 100 mg, 100 mg, Oral, BID  polyethylene glycol (PEG 3350) (MIRALAX) powder packet 17 g, 17 g, Oral, Daily PRN  magnesium hydroxide (MILK OF MAGNESIA) 400 MG/5ML suspension 30 mL, 30 m no claudication  GI: denies abdominal pain and denies heartburn  : no dysuria or hematuria  NEURO: denies headaches, focal weaknesses or paresthesias  All other systems reviewed and negative.   fatigue is present  All other systems were reviewed are negat patient.     Rosa Su MD  [address] [place] [address]  Interventional Cardiology  10/27/2021

## 2021-10-27 NOTE — PROGRESS NOTES
[facility]    Progress Note    Kev Clifton Patient Status:  Inpatient    1960 MRN [id number]   Location [facility] 4W/SW/SE Attending Valerie Cho MD   Hosp Day # 2 PCP None Pcp        Subjective:   Kev Clifton medicine. Once stable, continue PT/OT. Type I or II open bimalleolar fracture of left ankle, initial encounter        Open ankle fracture  Patient completed 24-hour course of IV antibiotics.   We will continue local wound care and no further antibioti

## 2021-10-27 NOTE — PLAN OF CARE
Spouse arrived today. Spouse stated she is unable to help him at home due to recent back surgery herself; is also very concerned about the pins of fixator banging into something also can not do the pin care and dressing change.  Patient was up with PT and U Progressing     Problem: PAIN - ADULT  Goal: Verbalizes/displays adequate comfort level or patient's stated pain goal  Description: INTERVENTIONS:  - Encourage pt to monitor pain and request assistance  - Assess pain using appropriate pain scale  - Adminis transportation as appropriate  - Identify discharge learning needs (meds, wound care, etc)  - Arrange for interpreters to assist at discharge as needed  - Consider post-discharge preferences of patient/family/discharge partner  - Complete POLST form as stephanie

## 2021-10-27 NOTE — PHYSICAL THERAPY NOTE
PHYSICAL THERAPY TREATMENT NOTE - INPATIENT     Room Number: 431/431-A       Presenting Problem:  bimalleolar fracture of R ankle- requiring R ankle open fracture irrigation and debridement with placement of external fixator on 10/25/21    Problem List  Pr supports that patients with this level of impairment may benefit from Home with home health PT. Family to assist, however, sons are not always at home per wife and wife has had recent back sx. RN aware of patient status post session.     DISCHARGE RECOMMEND CK    Patient End of Session: Up in chair;Needs met;Call light within reach;RN aware of session/findings; All patient questions and concerns addressed; Family present    CURRENT GOALS   Goals to be met by: 11/2/21  Patient Goal Patient's self-stated goal is:

## 2021-10-27 NOTE — SIGNIFICANT EVENT
RRT  Responded immediately to RRT called because of patient complaining of chest pain and tachycardia  Found patient in bed very anxious, shaky, sinus tachycardia on telemetry ~120-130  Pt attributes current symptoms to \"overdoing with physical therapy\",

## 2021-10-28 ENCOUNTER — APPOINTMENT (OUTPATIENT)
Dept: CT IMAGING | Facility: HOSPITAL | Age: 61
DRG: 492 | End: 2021-10-28
Attending: HOSPITALIST
Payer: OTHER MISCELLANEOUS

## 2021-10-28 ENCOUNTER — APPOINTMENT (OUTPATIENT)
Dept: ULTRASOUND IMAGING | Facility: HOSPITAL | Age: 61
DRG: 492 | End: 2021-10-28
Attending: HOSPITALIST
Payer: OTHER MISCELLANEOUS

## 2021-10-28 ENCOUNTER — APPOINTMENT (OUTPATIENT)
Dept: CV DIAGNOSTICS | Facility: HOSPITAL | Age: 61
DRG: 492 | End: 2021-10-28
Attending: INTERNAL MEDICINE
Payer: OTHER MISCELLANEOUS

## 2021-10-28 PROCEDURE — 93306 TTE W/DOPPLER COMPLETE: CPT | Performed by: INTERNAL MEDICINE

## 2021-10-28 PROCEDURE — 71260 CT THORAX DX C+: CPT | Performed by: HOSPITALIST

## 2021-10-28 PROCEDURE — 99254 IP/OBS CNSLTJ NEW/EST MOD 60: CPT | Performed by: INTERNAL MEDICINE

## 2021-10-28 PROCEDURE — 76705 ECHO EXAM OF ABDOMEN: CPT | Performed by: HOSPITALIST

## 2021-10-28 PROCEDURE — 99233 SBSQ HOSP IP/OBS HIGH 50: CPT | Performed by: HOSPITALIST

## 2021-10-28 RX ORDER — MAGNESIUM OXIDE 400 MG (241.3 MG MAGNESIUM) TABLET
400 TABLET ONCE
Status: COMPLETED | OUTPATIENT
Start: 2021-10-28 | End: 2021-10-28

## 2021-10-28 NOTE — PHYSICAL THERAPY NOTE
PHYSICAL THERAPY TREATMENT NOTE - INPATIENT     Room Number: 431/431-A       Presenting Problem:  bimalleolar fracture of R ankle- requiring R ankle open fracture irrigation and debridement with placement of external fixator on 10/25/21    Problem List  Pr required constant cues for proper sequencing , complete compliance to non WB Status for right LE , attention/cautions with movements  to external fixator in place throughout fxn mobility .         Patient with fair  progress towards goals during this sessio with hx of right shoulder pain/ dysfunction. . Patient was left in bedside chair at end of session with all needs in reach. Right LE elevated on pillow and LE elevated in recliner chair , alarm set and pt spouse present.       The patient's Approx D bed (including adjusting bedclothes, sheets and blankets)?: A Little   -   Sitting down on and standing up from a chair with arms (e.g., wheelchair, bedside commode, etc.): A Lot   -   Moving from lying on back to sitting on the side of the bed?: A Little

## 2021-10-28 NOTE — OCCUPATIONAL THERAPY NOTE
OCCUPATIONAL THERAPY TREATMENT NOTE - INPATIENT    Room Number: 431/431-A               Problem List  Principal Problem:    Bimalleolar fracture of right ankle, open type I or II, initial encounter  Active Problems:    Type I or II open bimalleolar fractur the skills required to return home. Pt does not demonstrate the physical skills required to safely return home. Recommend ALEXANDRA to maximize participation, independence, and safety.      The patient's Approx Degree of Impairment: 53.32% has been calculated bas Mobility: Min A x 2    BALANCE ASSESSMENT  Dynamic Standing: Min A    FUNCTIONAL ADL ASSESSMENT  Toileting: total A   Lower Body Dressing:  Mod A    Education Provided: role of OT, activity promotion --compensatory strategies   Patient End of Session: Up in

## 2021-10-28 NOTE — SIGNIFICANT EVENT
RRT    *See RRT Documentation Record*    9793-Reason the RRT was called: *chest pain and shortness of breath**  Assessment of patient leading up to RRT: *Alert oriented sitting in chair for about an hour after therapy session, spouse present in the room**

## 2021-10-28 NOTE — PROGRESS NOTES
[facility]     Cardiology Progress Note    Subjective:  No further chest pain. No shortness of breath.      Objective:  /82 (BP Location: Right arm)   Pulse 66   Temp 97.9 °F (36.6 °C) (Oral)   Resp 18   Ht 177.8 cm (5' 10\")   Wt 179

## 2021-10-28 NOTE — PROGRESS NOTES
Patient currently off the crowley with on going work-up for chest pain. From ortho standpoint, continue PT/OT when medically stable. NWB RLE. Continue daily dressing changes and pin site care.   Surgery including ex fix removal and ankle fx ORIF scheduled f

## 2021-10-28 NOTE — CM/SW NOTE
SW followed up on DC planning. SW received insurance information. Referrals for SNF entered after discussion with the family. Pt and family stating wife had recent back surgery is unable to help and sons are not always availible.  PT/OT to claudine to see i

## 2021-10-28 NOTE — PLAN OF CARE
Pt is mainly ao x 4 however he sits on the EOB to pee in urinal without calling so sets off bed alarm - however he is on camera monitor, pt is resting on room air, pt has r ankle elevated, pt is voiding via urinal, pt not able to pass stairs with P.T. toda limitations  - Instruct pt to call for assistance with activity based on assessment  - Modify environment to reduce risk of injury  - Provide assistive devices as appropriate  - Consider OT/PT consult to assist with strengthening/mobility  - Encourage toil

## 2021-10-29 PROBLEM — F10.20 ALCOHOL DEPENDENCE, CONTINUOUS (HCC): Status: ACTIVE | Noted: 2021-10-29

## 2021-10-29 PROBLEM — F10.231 DELIRIUM TREMENS (HCC): Status: ACTIVE | Noted: 2021-10-29

## 2021-10-29 PROBLEM — F39 EPISODIC MOOD DISORDER (HCC): Status: ACTIVE | Noted: 2021-10-29

## 2021-10-29 PROCEDURE — 99255 IP/OBS CONSLTJ NEW/EST HI 80: CPT | Performed by: INTERNAL MEDICINE

## 2021-10-29 PROCEDURE — 99233 SBSQ HOSP IP/OBS HIGH 50: CPT | Performed by: HOSPITALIST

## 2021-10-29 PROCEDURE — 90792 PSYCH DIAG EVAL W/MED SRVCS: CPT | Performed by: OTHER

## 2021-10-29 RX ORDER — DEXMEDETOMIDINE HYDROCHLORIDE 4 UG/ML
INJECTION, SOLUTION INTRAVENOUS CONTINUOUS
Status: DISCONTINUED | OUTPATIENT
Start: 2021-10-29 | End: 2021-11-04

## 2021-10-29 RX ORDER — QUETIAPINE 25 MG/1
50 TABLET, FILM COATED ORAL NIGHTLY
Status: DISCONTINUED | OUTPATIENT
Start: 2021-10-29 | End: 2021-11-01

## 2021-10-29 RX ORDER — DEXTROSE AND SODIUM CHLORIDE 5; .9 G/100ML; G/100ML
INJECTION, SOLUTION INTRAVENOUS CONTINUOUS
Status: DISCONTINUED | OUTPATIENT
Start: 2021-10-29 | End: 2021-11-18

## 2021-10-29 RX ORDER — LORAZEPAM 2 MG/ML
INJECTION INTRAMUSCULAR
Status: DISPENSED
Start: 2021-10-29 | End: 2021-10-30

## 2021-10-29 RX ORDER — HALOPERIDOL 5 MG/ML
2 INJECTION INTRAMUSCULAR EVERY 4 HOURS PRN
Status: DISCONTINUED | OUTPATIENT
Start: 2021-10-29 | End: 2021-10-29

## 2021-10-29 RX ORDER — MAGNESIUM OXIDE 400 MG (241.3 MG MAGNESIUM) TABLET
400 TABLET ONCE
Status: COMPLETED | OUTPATIENT
Start: 2021-10-29 | End: 2021-10-29

## 2021-10-29 RX ORDER — LORAZEPAM 2 MG/ML
2 INJECTION INTRAMUSCULAR ONCE
Status: COMPLETED | OUTPATIENT
Start: 2021-10-29 | End: 2021-10-29

## 2021-10-29 RX ORDER — HALOPERIDOL 5 MG/ML
5 INJECTION INTRAMUSCULAR ONCE
Status: COMPLETED | OUTPATIENT
Start: 2021-10-29 | End: 2021-10-29

## 2021-10-29 RX ORDER — HALOPERIDOL 5 MG/ML
INJECTION INTRAMUSCULAR
Status: DISPENSED
Start: 2021-10-29 | End: 2021-10-30

## 2021-10-29 RX ORDER — CHLORDIAZEPOXIDE HYDROCHLORIDE 5 MG/1
20 CAPSULE, GELATIN COATED ORAL 4 TIMES DAILY
Status: DISCONTINUED | OUTPATIENT
Start: 2021-10-29 | End: 2021-10-29

## 2021-10-29 RX ORDER — DEXMEDETOMIDINE HYDROCHLORIDE 4 UG/ML
INJECTION, SOLUTION INTRAVENOUS
Status: COMPLETED
Start: 2021-10-29 | End: 2021-10-29

## 2021-10-29 RX ORDER — HALOPERIDOL 5 MG/ML
2 INJECTION INTRAMUSCULAR EVERY 2 HOUR PRN
Status: DISCONTINUED | OUTPATIENT
Start: 2021-10-29 | End: 2021-12-10

## 2021-10-29 RX ORDER — LORAZEPAM 2 MG/ML
2 INJECTION INTRAMUSCULAR
Status: COMPLETED | OUTPATIENT
Start: 2021-10-29 | End: 2021-10-31

## 2021-10-29 RX ORDER — CHLORDIAZEPOXIDE HYDROCHLORIDE 10 MG/1
10 CAPSULE, GELATIN COATED ORAL 4 TIMES DAILY
Status: DISCONTINUED | OUTPATIENT
Start: 2021-10-29 | End: 2021-10-29

## 2021-10-29 NOTE — PROGRESS NOTES
[facility]  Hospitalist Progress Note     Bruno Rappo Patient Status:  Inpatient    1960  62year old [facility] [id number]   Location 431/431-A Attending iNck Granados MD   Hosp Day # 4 PCP None Pcp     Assessment & Plan:   ----------- is hepatic steatosis, cirrhosis and patent TIPS catheter        dvt prophylaxis: asa bid  code status: full code  dispo: home with [facility] vs rehab pending progress      Subjective:   ----------------------------------  Very restless, agitated, having hallucina NaCl       • metoprolol tartrate  25 mg Oral 2x Daily(Beta Blocker)   • LORazepam  1 mg Intravenous Once   • thiamine  100 mg Oral Daily   • multivitamin with minerals  1 tablet Oral Daily   • folic acid  1 mg Oral Daily   • losartan  25 mg Oral Daily   •

## 2021-10-29 NOTE — CONSULTS
[facility]    Report of Consultation    Nurys Aguilera Patient Status:  Inpatient    1960 MRN [id number]   Location [facility] 2W/SW Attending Petr Schulte MD   Hosp Day # 4 PCP None Pcp     Date of Admission:  10/2 never had any history of seizure or DTs before according to wife. Otherwise wife today denying any history of depression, manic episode, and anxiety or psychosis before. Wife denies any history or indication of any suicidal or homicidal ideation.     Pa Q4H PRN  chlordiazePOXIDE (LIBRIUM) cap 20 mg, 20 mg, Oral, QID  LORazepam (ATIVAN) 2 MG/ML injection, , ,   haloperidol lactate (HALDOL) 5 MG/ML injection, , ,   Dexmedetomidine HCl in NaCl (PRECEDEX) 400 MCG/100ML premix infusion, 0.2-1.5 mcg/kg/hr (Dosi  MG per tab 1 tablet, 1 tablet, Oral, Q4H PRN  influenza vaccine split quad (FLULAVAL) ages 6 months to 64 years inj 0.5ml, 0.5 mL, Intramuscular, Prior to discharge      Telmisartan 20 MG Oral Tab, Take 20 mg by mouth daily.   lactulose 20 GM/30ML Or 71, temperature 99.9 °F (37.7 °C), temperature source Temporal, resp. rate 15, height 70\", weight 81.2 kg (179 lb), SpO2 95 %.     Mental Status Exam:     Appearance:  Stated age male in hospital gown laying down in bed in Andrea vest with right lower extre lorazepam 2 mg IV every 4 hour scheduled in addition to the as needed. 4.  Seroquel 50 mg nightly. 5.  Provide patient with 1 injection of Haldol 5 mg/Ativan 2 mg IM now in the same syringe.   6.  Utilize Haldol 2 mg IV every 2 hours as needed for agitati

## 2021-10-29 NOTE — PHYSICAL THERAPY NOTE
Chart reviewed ,  Discussed pt status with RN . Per RN pt with increase CIWA scores with increasing agitation , impulsiveness and plans for transfer to 2nd floor this AM.    Pt is not appropriate for therapy participation at this time.    Therapy will hol

## 2021-10-29 NOTE — PROGRESS NOTES
[facility] - [facility]  Hospitalist Progress Note     Riya Mcgowanjeni Patient Status:  Inpatient    1960  62year old [facility] [id number]   Location 431/431-A Attending Henrique Urbina MD   Hosp Day # 3 PCP None Pcp     Assessment & Plan:   ----------- normal, no mass, no HSM, no rebound/guarding. Neuro: Normal reflexes, CN. Sensory/motor exams grossly normal deficit. MS: No joint effusions. No peripheral edema. Right lower leg with external fixation device  Skin: Skin is warm and dry.  No rashes, e above.

## 2021-10-29 NOTE — OCCUPATIONAL THERAPY NOTE
Pt not seen for OT this date secondary to per nursing, pt is not appropriate.  Will follow up with pt next date as pt is able to participate

## 2021-10-29 NOTE — PROGRESS NOTES
[facility]    Progress Note    Sander Garcia Patient Status:  Inpatient    1960 MRN [id number]   Location [facility] 2W/SW Attending Ronald Chatman MD   Hosp Day # 4 PCP None Pcp       Subjective:   Sander Garcia is a INR 2.05 (H) 10/28/2021    MG 1.8 10/29/2021    TROP 0.061 (HH) 10/28/2021    B12 1,551 (H) 10/28/2021       CT CHEST PE AORTA (IV ONLY) (CPT=71260)    Result Date: 10/28/2021  CONCLUSION:  1. No pulmonary embolism.  2. Prior granulomatous disease in the

## 2021-10-29 NOTE — PLAN OF CARE
Problem: Patient Centered Care  Goal: Patient preferences are identified and integrated in the patient's plan of care  Description: Interventions:  - What would you like us to know as we care for you? I just started working again at the [employer].   - Provide anticipated neutropenic period  Description: INTERVENTIONS  - Monitor WBC  - Administer growth factors as ordered  - Implement neutropenic guidelines  Outcome: Progressing     Problem: SAFETY ADULT - FALL  Goal: Free from fall injury  Description: Oneyda Velazquez for any contributing factors to confusion (electrolyte disturbances, delirium, medications)  - Discontinue any unnecessary medical devices as soon as possible  - Assess the patient's physical comfort, circulation, skin condition, hydration, nutrition and e

## 2021-10-29 NOTE — PROGRESS NOTES
Progress Note  Renay Oconnell Patient Status:  Inpatient    1960 MRN [id number]   Location [facility] 4W/SW/SE Attending Bhavya Gaston MD   Hosp Day # 4 PCP None Pcp     Subjective:  Sleeping during exam. Discussed with RN, patient multivitamin with minerals  1 tablet Oral Daily   • folic acid  1 mg Oral Daily   • losartan  25 mg Oral Daily   • tamsulosin  0.4 mg Oral Daily   • docusate sodium  100 mg Oral BID   • multivitamin  1 tablet Oral Daily             Assessment:  • Right ank

## 2021-10-29 NOTE — CONSULTS
[facility]OSP - [facility]    Report of Hematology/Oncology Consultation    Riya Simmons Patient Status:  Inpatient    1960 MRN [id number]   Location 431/431-A Attending Jamil Whyte MD   Date of admission 10/25/2021  1:58 PM   PCP None Allergies:  Patient has no known allergies. MEDS:  No current facility-administered medications on file prior to encounter. Telmisartan 20 MG Oral Tab, Take 20 mg by mouth daily. , Disp: , Rfl:   lactulose 20 GM/30ML Oral Solution, Take 30 mL by m and rhythm. Abdomen: Soft, non tender with good bowel sounds. Extremities: R LE with external fixation and some blood on the dressing.     Laboratory Data:      Recent Results (from the past 24 hour(s))   Comp Metabolic Panel (14)    Collection Time: 10/ (3) uL    Neutrophil Absolute 2.94 1.50 - 7.70 x10(3) uL    Lymphocyte Absolute 1.16 1.00 - 4.00 x10(3) uL    Monocyte Absolute 0.63 0.10 - 1.00 x10(3) uL    Eosinophil Absolute 0.16 0.00 - 0.70 x10(3) uL    Basophil Absolute 0.04 0.00 - 0.20 x10(3) uL coagulopathy secondary to liver disease. This is mild. Would not recommend vitamin K. Would recommend that the patient received 1 unit of fresh frozen plasma prior to surgery to correct coagulopathy and achieve hemostasis during surgery.     Thrombocytop

## 2021-10-29 NOTE — PROGRESS NOTES
[facility]    Progress Note    Nolene Cleverly Patient Status:  Inpatient    1960 MRN [id number]   Location [facility] 4W/SW/SE Attending Leonidas Cortés MD   Hosp Day # 4 PCP None Pcp        Subjective:   [place] Wendy of left ankle, initial encounter        Open ankle fracture        Coagulopathy [facility])  Per hematology      Thrombocytopenia [facility])  Per hematology      Iron deficiency anemia  Supplementation            Results:     Lab Results   Component Value Date    WBC

## 2021-10-29 NOTE — CONSULTS
[facility] HOSP - [facility]    Report of Consultation    Tawnya Posadas Patient Status:  Inpatient    1960 MRN [id number]   Location [facility] 2W/SW Attending Vashti Lilly MD   Hosp Day # 4 PCP None Pcp     Date of Admission:  10/25/ Socioeconomic History      Marital status:     Tobacco Use      Smoking status: Former Smoker        Quit date: 2009        Years since quittin.3      Smokeless tobacco: Never Used    Substance and Sexual Activity      Alcohol use:  Yes (DILAUDID) 1 MG/ML injection 0.4 mg, 0.4 mg, Intravenous, Q2H PRN   Or  HYDROmorphone HCl (DILAUDID) 1 MG/ML injection 0.8 mg, 0.8 mg, Intravenous, Q2H PRN  HYDROcodone-acetaminophen (NORCO) 5-325 MG per tab 1 tablet, 1 tablet, Oral, Q4H PRN   Or  HYDROcod AST 68 (H) 10/28/2021    ALT 34 10/28/2021    PTT 40.4 (H) 10/28/2021    INR 2.05 (H) 10/28/2021    PTP 22.9 (H) 10/28/2021    MG 1.8 10/29/2021    TROP 0.061 (HH) 10/28/2021    B12 1,551 (H) 10/28/2021         Imaging  CT CHEST PE AORTA (IV ONLY) (CPT=

## 2021-10-30 PROCEDURE — 99232 SBSQ HOSP IP/OBS MODERATE 35: CPT | Performed by: OTHER

## 2021-10-30 PROCEDURE — 99233 SBSQ HOSP IP/OBS HIGH 50: CPT | Performed by: HOSPITALIST

## 2021-10-30 PROCEDURE — 99232 SBSQ HOSP IP/OBS MODERATE 35: CPT | Performed by: INTERNAL MEDICINE

## 2021-10-30 RX ORDER — LORAZEPAM 2 MG/ML
1 INJECTION INTRAMUSCULAR
Status: COMPLETED | OUTPATIENT
Start: 2021-10-31 | End: 2021-11-01

## 2021-10-30 NOTE — PROGRESS NOTES
[facility]     Progress Note        Bruno Gloria Patient Status:  Inpatient    1960 MRN [id number]   Location [facility] 2W/SW Attending Concepcion Kline MD   Hosp Day # 5 PCP None Pcp       Subjective:   Patient seen a mg, Intravenous, Q2H PRN  docusate sodium (COLACE) cap 100 mg, 100 mg, Oral, BID  polyethylene glycol (PEG 3350) (MIRALAX) powder packet 17 g, 17 g, Oral, Daily PRN  magnesium hydroxide (MILK OF MAGNESIA) 400 MG/5ML suspension 30 mL, 30 mL, Oral, Daily PRN Bimalleolar fracture right ankle status post open reduction external fixation and debridement  3. Hypertension  4. EtOH abuse  5. Coagulopathy  6. Thrombocytopenia  7.   Hyperbilirubinemia     Plan   -Patient status post open reduction and external fixa

## 2021-10-30 NOTE — PLAN OF CARE
Pt resting in bed appearing comfortable @ this time. Precedex @ 0.4mcg along with scheduled Ativan. Some periods of restlessness/anxiety noted. Restraints remain in place as he will attempt to sit up/get oob/pull at lines/tele.  Dr. Kia Howard updated with pt evaluate response  - Consider cultural and social influences on pain and pain management  - Manage/alleviate anxiety  - Utilize distraction and/or relaxation techniques  - Monitor for opioid side effects  - Notify MD/LIP if interventions unsuccessful or pa

## 2021-10-30 NOTE — PROGRESS NOTES
Patient is a 62year old   male with history of alcohol dependence, alcoholic cirrhosis liver, BPH and history of renal cancer who presented to the hospital post fall and found with female oral fracture of right ankle s/p open reduction. 2009.  Patient is heavy in alcohol abuse. Amount not been specified.       Medical History:       Past Medical History  Past Medical History:   Diagnosis Date   • Alcoholic cirrhosis of liver (HCC)    • BPH (benign prostatic hyperplasia)    • Cancer ([facility] (ATIVAN) tab 2 mg, 2 mg, Oral, Q1H PRN   Or  LORazepam (ATIVAN) injection 2 mg, 2 mg, Intravenous, Q1H PRN  thiamine (Vitamin B-1) tab 100 mg, 100 mg, Oral, Daily  multivitamin with minerals (ADULT) tab 1 tablet, 1 tablet, Oral, Daily  folic acid (Eula Boothe) As by Admitting/Attending    Results:     Laboratory Data:  Lab Results   Component Value Date    WBC 2.7 (L) 10/30/2021    HGB 10.8 (L) 10/30/2021    HCT 31.7 (L) 10/30/2021    PLT 83.0 (L) 10/30/2021    CREATSERUM 0.68 (L) 10/30/2021    BUN 12 10/30/ Effort to be cooperative. Speech:  Clearly speech initially in Guyanese but was able to speak in English to. Mood:  \"Confused  Affect:  Restricted  Thought process:  Circumstantial  Thought content:  No paranoia reported.   No suicidality concern  Percept Differential With Platelet      Troponin I      CBC With Differential With Platelet      Comp Metabolic Panel (14)      Magnesium      Prothrombin Time (PT)      PTT, Activated      Troponin I      Troponin I      Lipid Panel      Troponin I      Magnesium

## 2021-10-30 NOTE — PLAN OF CARE
Restraints remain in place for tube/line safety.     Problem: Safety Risk - Non-Violent Restraints  Goal: Patient will remain free from self-harm  Description: INTERVENTIONS:  - Apply the least restrictive restraint to prevent harm  - Notify patient and fam

## 2021-10-30 NOTE — PROGRESS NOTES
[facility] [place] [facility]    Progress Note    Tawnyaallegra Posadas Patient Status:  Inpatient    1960 MRN [id number]   Location [facility] 2W/SW Attending Vashti Lilly MD   Hosp Day # 5 PCP None Pcp       Subjective:   Tawnya Posadas is a the chest. 3. 4 mm and smaller noncalcified lower lobe pulmonary nodules likely represent noncalcified granulomas. 4. Hepatic cirrhosis with TIPS shunt. 5. Splenomegaly.  6. Atherosclerotic vascular calcification including multivessel coronary artery calcif

## 2021-10-30 NOTE — PHYSICAL THERAPY NOTE
Pt was to be seen for PT treatment session. Pt w/ increasing CIWA scores, agitation, impulsiveness and was transferred to CCU yesterday.  Today, pt on restraints ond posey vest. Consulted w/ RN Fidel Travis who said pt is sedated and not appropriate to work with th

## 2021-10-30 NOTE — PROGRESS NOTES
[facility] HOSP - [facility]  Hospitalist Progress Note     Corrinne Beath Patient Status:  Inpatient    1960  62year old [facility] [id number]   Location 431/431-A Attending Andie Miguel MD   Hosp Day # 5 PCP None Pcp     Assessment & Plan:   ----------- surgery, and give fresh frozen plasma on the morning prior surgery    Jaundice  -check liver US--> no acute intra-abdominal process, no hepatobiliary dilatation, there is hepatic steatosis, cirrhosis and patent TIPS catheter        dvt prophylaxis: asa bid • QUEtiapine  50 mg Oral Nightly   • [Held by provider] metoprolol tartrate  25 mg Oral 2x Daily(Beta Blocker)   • LORazepam  1 mg Intravenous Once   • thiamine  100 mg Oral Daily   • multivitamin with minerals  1 tablet Oral Daily   • folic acid  1 mg O

## 2021-10-30 NOTE — PLAN OF CARE
Bilateral soft wrist restraints and posey vest remain in place. Will continue close monitoring.     Problem: Safety Risk - Non-Violent Restraints  Goal: Patient will remain free from self-harm  Description: INTERVENTIONS:  - Apply the least restrictive rest

## 2021-10-31 PROCEDURE — 99232 SBSQ HOSP IP/OBS MODERATE 35: CPT | Performed by: OTHER

## 2021-10-31 PROCEDURE — 99232 SBSQ HOSP IP/OBS MODERATE 35: CPT | Performed by: INTERNAL MEDICINE

## 2021-10-31 PROCEDURE — 99233 SBSQ HOSP IP/OBS HIGH 50: CPT | Performed by: HOSPITALIST

## 2021-10-31 RX ORDER — MAGNESIUM OXIDE 400 MG (241.3 MG MAGNESIUM) TABLET
800 TABLET ONCE
Status: COMPLETED | OUTPATIENT
Start: 2021-10-31 | End: 2021-10-31

## 2021-10-31 NOTE — PLAN OF CARE
Patient more awake and cooperative today. Patient ate all three meals. Able to turn self in bed and assist in turns. Patient laughing and joking with this RN. Pleasantly confused. IVF continued. Sarmiento in place. Bath given.  Dressing change and pin care per reduce risk of injury  - Provide assistive devices as appropriate  - Consider OT/PT consult to assist with strengthening/mobility  - Encourage toileting schedule  10/31/2021 1840 by Usha Goode RN  Outcome: Progressing  10/31/2021 1759 by Usha Goode

## 2021-10-31 NOTE — PLAN OF CARE
Problem: PAIN - ADULT  Goal: Verbalizes/displays adequate comfort level or patient's stated pain goal  Description: INTERVENTIONS:  - Encourage pt to monitor pain and request assistance  - Assess pain using appropriate pain scale  - Administer analgesics i.e. lights off, TV off, minimize noise and interruptions  - Encourage family to assist in orientation and promotion of home routines  Outcome: Progressing

## 2021-10-31 NOTE — PROGRESS NOTES
[facility] HOSP - [facility]  Hospitalist Progress Note     Julietageovanna Durand Patient Status:  Inpatient    1960  62year old [facility] [id number]   Location 431/431-A Attending Leopoldo Bruns, MD   Hosp Day # 6 PCP None Pcp     Assessment & Plan:   ----------- aspirin about 5-7 days prior surgery, and give fresh frozen plasma on the morning prior surgery    Jaundice  -check liver US--> no acute intra-abdominal process, no hepatobiliary dilatation, there is hepatic steatosis, cirrhosis and patent TIPS catheter • metoprolol tartrate  25 mg Oral 2x Daily(Beta Blocker)   • LORazepam  1 mg Intravenous 6 times per day   • QUEtiapine  50 mg Oral Nightly   • LORazepam  1 mg Intravenous Once   • thiamine  100 mg Oral Daily   • multivitamin with minerals  1 tablet

## 2021-10-31 NOTE — PLAN OF CARE
Patient confused and anxious. Attempting to get out of bed during bedside shift report even after verbal redirection. Bilateral wrist restraints and posey continued. No signs of injury. Patient educated on safety.     Problem: Safety Risk - Non-Violent R

## 2021-10-31 NOTE — PROGRESS NOTES
[facility]    Progress Note    Munira De Patient Status:  Inpatient    1960 MRN [id number]   Location [facility] 2W/SW Attending Dionte Car MD   Hosp Day # 6 PCP None Pcp     Subjective:   Subjective:  Lauro anemia        Delirium tremens (HCC)        Episodic mood disorder ([facility]        Alcohol dependence, continuous ([facility]                Ed Olivo PA-C  10/31/2021

## 2021-10-31 NOTE — PROGRESS NOTES
[facility]     Progress Note        Shavon Parks Patient Status:  Inpatient    1960 MRN [id number]   Location [facility] 2W/SW Attending Davon Bowen MD   Hosp Day # 6 PCP None Pcp       Subjective:   Patient seen a MG/ML injection 1 mg, 1 mg, Intravenous, Q2H PRN  docusate sodium (COLACE) cap 100 mg, 100 mg, Oral, BID  polyethylene glycol (PEG 3350) (MIRALAX) powder packet 17 g, 17 g, Oral, Daily PRN  magnesium hydroxide (MILK OF MAGNESIA) 400 MG/5ML suspension 30 mL 10/31/2021    ALT 77 10/31/2021    MG 1.5 10/31/2021    PHOS 2.9 10/31/2021       No results found. Assessment   1. Delirium tremens  2. Bimalleolar fracture right ankle status post open reduction external fixation and debridement  3.   Hypertens

## 2021-11-01 PROCEDURE — 99233 SBSQ HOSP IP/OBS HIGH 50: CPT | Performed by: INTERNAL MEDICINE

## 2021-11-01 PROCEDURE — 99232 SBSQ HOSP IP/OBS MODERATE 35: CPT | Performed by: OTHER

## 2021-11-01 PROCEDURE — 99233 SBSQ HOSP IP/OBS HIGH 50: CPT | Performed by: HOSPITALIST

## 2021-11-01 RX ORDER — LORAZEPAM 2 MG/ML
2 INJECTION INTRAMUSCULAR EVERY 6 HOURS SCHEDULED
Status: DISCONTINUED | OUTPATIENT
Start: 2021-11-01 | End: 2021-11-02

## 2021-11-01 RX ORDER — HEPARIN SODIUM 5000 [USP'U]/ML
5000 INJECTION, SOLUTION INTRAVENOUS; SUBCUTANEOUS EVERY 8 HOURS SCHEDULED
Status: DISPENSED | OUTPATIENT
Start: 2021-11-01 | End: 2021-11-03

## 2021-11-01 NOTE — PLAN OF CARE
Problem: Patient Centered Care  Goal: Patient preferences are identified and integrated in the patient's plan of care  Description: Interventions:  - What would you like us to know as we care for you?  Lives with wife, has increased his drinking in the pa period  Description: INTERVENTIONS  - Monitor WBC  - Administer growth factors as ordered  - Implement neutropenic guidelines  Outcome: Progressing     Problem: SAFETY ADULT - FALL  Goal: Free from fall injury  Description: INTERVENTIONS:  - Assess pt freq

## 2021-11-01 NOTE — PLAN OF CARE
Bilateral wrist restraints and posey vest in place to prevent patient from pulling and lines and tubes. Pin care provided to right ankle dressing. Patient confused at times. Patient talking to himself in room. Will monitor.       Problem: Safety Risk - Non-

## 2021-11-01 NOTE — PROGRESS NOTES
Patient is a 62year old   male with history of alcohol dependence, alcoholic cirrhosis liver, BPH and history of renal cancer who presented to the hospital post fall and found with female oral fracture of right ankle s/p open reduction.  and lives with his wife. Patient is a former smoker supposedly he quit in 2009. Patient is heavy in alcohol abuse. Amount not been specified.       Medical History:       Past Medical History  Past Medical History:   Diagnosis Date   • Alcoholic injection 1 mg, 1 mg, Intravenous, Q1H PRN   Or  LORazepam (ATIVAN) tab 2 mg, 2 mg, Oral, Q1H PRN   Or  LORazepam (ATIVAN) injection 2 mg, 2 mg, Intravenous, Q1H PRN  thiamine (Vitamin B-1) tab 100 mg, 100 mg, Oral, Daily  multivitamin with minerals (ADULT As by Admitting/Attending    Results:     Laboratory Data:  Lab Results   Component Value Date    WBC 6.2 10/31/2021    HGB 12.3 (L) 10/31/2021    HCT 36.0 (L) 10/31/2021    PLT 96.0 (L) 10/31/2021    CREATSERUM 0.87 10/31/2021    BUN 9 10/31/2021    N Impression:        Delirium tremens. Episodic mood disorder, agitation. Alcohol dependence, continuous.   Bimalleolar fracture of right ankle, open type I or II, initial encounter  Thrombocytopenia (HCC)  Iron deficiency anemia      The patient has be Panel      CBC With Differential With Platelet      Magnesium      Renal Function Panel      Hepatic Function Panel (7)      Magnesium      Type and screen      ABORH (Blood Type)      Antibody Screen      Rapid SARS-CoV-2 by PCR      Meds This Visit:  Aníbal

## 2021-11-01 NOTE — PROGRESS NOTES
[facility]    Progress Note      Assessment and Plan:   1. Status post bimalleolar fracture of right ankle    Recommendations: As per orthopedics    2. DVT prophylaxis–we will begin subcutaneous heparin    3.   Alcohol withdrawal state

## 2021-11-01 NOTE — OCCUPATIONAL THERAPY NOTE
Patient sedated and not appropriate to be seen; will re-attempt tomorrow.      [address] [facility], OTR/L ext [phone]

## 2021-11-01 NOTE — PHYSICAL THERAPY NOTE
Pt was to be seen for PT treatment session. Consulted w/ RN Arsh Smith who said pt is sedated and not appropriate to work with therapy today. Will re-attempt tomorrow if appropriate to see pt.

## 2021-11-01 NOTE — PROGRESS NOTES
[facility]  Hospitalist Progress Note     [place] Patient Status:  Inpatient    1960  62year old [facility] [id number]   Location 431/431-A Attending Rivera Sultana MD   Hosp Day # 7 PCP None Pcp     Assessment & Plan:   ----------- would recommend holding aspirin about 5-7 days prior surgery, and give fresh frozen plasma on the morning prior surgery    Jaundice  -check liver US--> no acute intra-abdominal process, no hepatobiliary dilatation, there is hepatic steatosis, cirrhosis and INR 2.05*   PTT 40.4*       • Heparin Sodium (Porcine)  5,000 Units Subcutaneous Q8H [facility]   • LORazepam  2 mg Intravenous 4 times per day   • metoprolol tartrate  25 mg Oral 2x Daily(Beta Blocker)   • LORazepam  1 mg Intravenous Once   • thiamine  100 mg

## 2021-11-02 PROCEDURE — 99232 SBSQ HOSP IP/OBS MODERATE 35: CPT | Performed by: INTERNAL MEDICINE

## 2021-11-02 PROCEDURE — 99232 SBSQ HOSP IP/OBS MODERATE 35: CPT | Performed by: OTHER

## 2021-11-02 PROCEDURE — 99233 SBSQ HOSP IP/OBS HIGH 50: CPT | Performed by: HOSPITALIST

## 2021-11-02 RX ORDER — LORAZEPAM 2 MG/ML
1 INJECTION INTRAMUSCULAR EVERY 6 HOURS SCHEDULED
Status: DISCONTINUED | OUTPATIENT
Start: 2021-11-03 | End: 2021-11-03

## 2021-11-02 RX ORDER — MAGNESIUM SULFATE HEPTAHYDRATE 40 MG/ML
2 INJECTION, SOLUTION INTRAVENOUS ONCE
Status: COMPLETED | OUTPATIENT
Start: 2021-11-02 | End: 2021-11-02

## 2021-11-02 RX ORDER — DIAZEPAM 2 MG/1
2 TABLET ORAL 3 TIMES DAILY
Status: DISCONTINUED | OUTPATIENT
Start: 2021-11-02 | End: 2021-11-03

## 2021-11-02 NOTE — PROGRESS NOTES
[facility]     Progress Note        Tawnya Posadas Patient Status:  Inpatient    1960 MRN [id number]   Location [facility] 2W/SW Attending Vashti Lilly MD   Hosp Day # 8 PCP None Pcp       Subjective:   Patient seen a cap 0.4 mg, 0.4 mg, Oral, Daily  morphINE sulfate (PF) 2 MG/ML injection 1 mg, 1 mg, Intravenous, Q2H PRN  docusate sodium (COLACE) cap 100 mg, 100 mg, Oral, BID  polyethylene glycol (PEG 3350) (MIRALAX) powder packet 17 g, 17 g, Oral, Daily PRN  magnesium post open reduction external fixation and debridement  3. Hypertension  4. EtOH abuse  5. Coagulopathy  6. Thrombocytopenia  7. Hyperbilirubinemia     Plan   -Patient status post open reduction and external fixation and debridement on 10/25/2021.   His

## 2021-11-02 NOTE — PROGRESS NOTES
[facility]  Hospitalist Progress Note     Eulalia Hummel Patient Status:  Inpatient    1960  62year old [facility] [id number]   Location 431/431-A Attending Olivier Jimenez MD   Hosp Day # 8 PCP None Pcp     Assessment & Plan:   ----------- surgery, and give fresh frozen plasma on the morning prior surgery    Jaundice  -check liver US--> no acute intra-abdominal process, no hepatobiliary dilatation, there is hepatic steatosis, cirrhosis and patent TIPS catheter        dvt prophylaxis: asa bid 24.0   ALKPHO 101  --   --  104  --    AST 68*  --   --  143*  --    ALT 34  --   --  77*  --    BILT 4.9*  --   --  6.9*  --    TP 6.3*  --   --  6.6  --     < > = values in this interval not displayed.      Recent Labs   Lab 10/28/21  0626   INR 2.05*   P

## 2021-11-02 NOTE — PHYSICAL THERAPY NOTE
PHYSICAL THERAPY TREATMENT NOTE - INPATIENT     Room Number: 615/483-L       Presenting Problem:  bimalleolar fracture of R ankle- requiring R ankle open fracture irrigation and debridement with placement of external fixator on 10/25/21    Problem List  Pr Research supports that patients with this level of impairment may benefit from LTAC. Pt with cognitive deficits related to alcohol w/d. Pt was IND with ADLs prior to admission and working full time.  Pt is below his functional baseline and not safe for d/c another person does the patient currently need. ..    Help from Another: Moving to and from a bed to a chair (including a wheelchair)?: Total   Help from Another: Need to walk in hospital room?: Total   Help from Another: Climbing 3-5 steps with a railing?:

## 2021-11-02 NOTE — PLAN OF CARE
Wrist restraints removed during lunch, Brady Moser was able to eat with minimal assistance- requires queues from wife or staff to not spill or eat to fast. Intermittently participating in assessments. Nonsensical speech throughout the day. Tolerating restraints. with activity and pre-medicate as appropriate  Outcome: Progressing     Problem: SAFETY ADULT - FALL  Goal: Free from fall injury  Description: INTERVENTIONS:  - Assess pt frequently for physical needs  - Identify cognitive and physical deficits and behavi rehab    Interventions:  - PT OT, pain management, psychiatry on consult  - See additional Care Plan goals for specific interventions  Outcome: Not Progressing     Problem: RISK FOR INFECTION - ADULT  Goal: Absence of fever/infection during anticipated myron

## 2021-11-02 NOTE — PROGRESS NOTES
Patient is a 62year old   male with history of alcohol dependence, alcoholic cirrhosis liver, BPH and history of renal cancer who presented to the hospital post fall and found with female oral fracture of right ankle s/p open reduction. with his wife. Patient is a former smoker supposedly he quit in 2009. Patient is heavy in alcohol abuse. Amount not been specified.       Medical History:       Past Medical History  Past Medical History:   Diagnosis Date   • Alcoholic cirrhosis of liver PRN   Or  LORazepam (ATIVAN) injection 1 mg, 1 mg, Intravenous, Q1H PRN   Or  LORazepam (ATIVAN) tab 2 mg, 2 mg, Oral, Q1H PRN   Or  LORazepam (ATIVAN) injection 2 mg, 2 mg, Intravenous, Q1H PRN  thiamine (Vitamin B-1) tab 100 mg, 100 mg, Oral, Daily  mult Allergies      Review of Systems:     As by Admitting/Attending    Results:     Laboratory Data:  Lab Results   Component Value Date    WBC 6.2 10/31/2021    HGB 12.3 (L) 10/31/2021    HCT 36.0 (L) 10/31/2021    PLT 96.0 (L) 10/31/2021    CREATSERUM 0.87 1 distractibility  Memory: Impaired due to recent delirium  Intellect: Limited due to his confusion  Judgment and Insight: Poor due to his poor cognitive function    Impression:     Impression:        Delirium tremens. Episodic mood disorder, agitation.   Al Ferritin      Vitamin B12 with Reflex to MMA      Vitamin B12 with reflex to MMA      Reticulocyte Count      Magnesium      Ammonia, Plasma      CBC With Differential With Platelet      Renal Function Panel      CBC With Differential With Platelet      Ma

## 2021-11-02 NOTE — PLAN OF CARE
Patient is oriented to person/condition. Understands English and cooperative/follows commands. At times will get confused and tries to remove reis or kick pins on external fixation. Patient will mumble and talk with himself; improves with ativan PRN. activity and pre-medicate as appropriate  Outcome: Progressing     Problem: RISK FOR INFECTION - ADULT  Goal: Absence of fever/infection during anticipated neutropenic period  Description: INTERVENTIONS  - Monitor WBC  - Administer growth factors as ordere self-harm  Description: INTERVENTIONS:  - Apply the least restrictive restraint to prevent harm  - Notify patient and family of reasons restraints applied  - Assess for any contributing factors to confusion (electrolyte disturbances, delirium, medications)

## 2021-11-03 PROCEDURE — 99232 SBSQ HOSP IP/OBS MODERATE 35: CPT | Performed by: INTERNAL MEDICINE

## 2021-11-03 PROCEDURE — 99233 SBSQ HOSP IP/OBS HIGH 50: CPT | Performed by: HOSPITALIST

## 2021-11-03 PROCEDURE — 99232 SBSQ HOSP IP/OBS MODERATE 35: CPT | Performed by: OTHER

## 2021-11-03 RX ORDER — LORAZEPAM 2 MG/ML
2 INJECTION INTRAMUSCULAR EVERY 6 HOURS SCHEDULED
Status: DISCONTINUED | OUTPATIENT
Start: 2021-11-03 | End: 2021-11-04

## 2021-11-03 RX ORDER — HALOPERIDOL 5 MG/ML
2 INJECTION INTRAMUSCULAR EVERY 6 HOURS SCHEDULED
Status: DISCONTINUED | OUTPATIENT
Start: 2021-11-03 | End: 2021-11-04

## 2021-11-03 RX ORDER — POTASSIUM CHLORIDE 1.5 G/1.77G
40 POWDER, FOR SOLUTION ORAL ONCE
Status: COMPLETED | OUTPATIENT
Start: 2021-11-03 | End: 2021-11-03

## 2021-11-03 NOTE — PROGRESS NOTES
[facility]     Progress Note        Adalberto Portal Patient Status:  Inpatient    1960 MRN [id number]   Location [facility] 2W/SW Attending George Marshall MD   Hosp Day # 9 PCP None Pcp       Subjective:   Patient seen a (TYLENOL) tab 650 mg, 650 mg, Oral, Q6H PRN  tamsulosin (FLOMAX) cap 0.4 mg, 0.4 mg, Oral, Daily  morphINE sulfate (PF) 2 MG/ML injection 1 mg, 1 mg, Intravenous, Q2H PRN  docusate sodium (COLACE) cap 100 mg, 100 mg, Oral, BID  polyethylene glycol ( PHOS 2.8 11/03/2021       No results found. Assessment   1. Delirium tremens  2. Bimalleolar fracture right ankle status post open reduction external fixation and debridement  3. Hypertension  4. EtOH abuse  5. Coagulopathy  6.   Thrombocyt

## 2021-11-03 NOTE — PLAN OF CARE
Patient continues to have alter mental status and needs restraints (BL wrist and posey).     Problem: Safety Risk - Non-Violent Restraints  Goal: Patient will remain free from self-harm  Description: INTERVENTIONS:  - Apply the least restrictive restraint t

## 2021-11-03 NOTE — PLAN OF CARE
Problem: PAIN - ADULT  Goal: Verbalizes/displays adequate comfort level or patient's stated pain goal  Description: INTERVENTIONS:  - Encourage pt to monitor pain and request assistance  - Assess pain using appropriate pain scale  - Administer analgesics disturbances, delirium, medications)  - Discontinue any unnecessary medical devices as soon as possible  - Assess the patient's physical comfort, circulation, skin condition, hydration, nutrition and elimination needs   - Reorient and redirection as needed

## 2021-11-03 NOTE — PROGRESS NOTES
[facility]  Hospitalist Progress Note     Aryan Jameson Patient Status:  Inpatient    1960  62year old [facility] [id number]   Location -A Attending Teresa Bauer MD   Hosp Day # 9 PCP None Pcp     Assessment & Plan:   ----------- 12.3* 11.9* 11.3*   HCT 36.0* 34.8* 32.4*   MCV 99.2 99.1 98.2   MCH 33.9 33.9 34.2*   MCHC 34.2 34.2 34.9   RDW 13.5 13.3 13.2   NEPRELIM 4.30 3.47 2.67   WBC 6.2 5.8 5.6   PLT 96.0* 97.0* 105.0*     Recent Labs   Lab 10/28/21  0626 10/28/21  0626 10/30/2 HYDROcodone-acetaminophen, influenza virus vaccine PF      >35min spent, >50% spent counseling and coordinating care in the form of educating pt/family and d/w consultants and staff. Most of the time spent discussing plan for continued detox.   We discussed

## 2021-11-03 NOTE — PHYSICAL THERAPY NOTE
Chart reviewed, discussed case with RN. Pt agitated overnight and unable to sleep, CIWA 20 this AM. Pt sleeping and drowsy most of the day. Not appropriate for therapy at this time. Will f/u in future as appropriate, schedule permitting.     Merlinda Prima

## 2021-11-03 NOTE — PLAN OF CARE
Patient needs restraints (wrist and posey) due to continued altered mental status.      Problem: Safety Risk - Non-Violent Restraints  Goal: Patient will remain free from self-harm  Description: INTERVENTIONS:  - Apply the least restrictive restraint to pre

## 2021-11-03 NOTE — PROGRESS NOTES
Patient is a 62year old   male with history of alcohol dependence, alcoholic cirrhosis liver, BPH and history of renal cancer who presented to the hospital post fall and found with female oral fracture of right ankle s/p open reduction. [facility])     without bleeding   • Former smoker    • Heavy drinker of alcohol    • Hepatic encephalopathy ([facility] [address]    • Portal hypertension ([facility] [address]    • Renal cancer, left [facility])        Past Surgical History  Past Surgical History:   Procedure Laterality Date   • I Daily  folic acid (FOLVITE) tab 1 mg, 1 mg, Oral, Daily  hydrALAzine HCl (APRESOLINE) injection 10 mg, 10 mg, Intravenous, Q4H PRN  acetaminophen (TYLENOL) tab 650 mg, 650 mg, Oral, Q6H PRN  tamsulosin (FLOMAX) cap 0.4 mg, 0.4 mg, Oral, Daily  morphINE sul 11/03/2021     11/03/2021    CO2 23.0 11/03/2021    GLU 98 11/03/2021    CA 7.9 (L) 11/03/2021    ALB 2.1 (L) 11/03/2021    ALKPHO 104 10/31/2021    TP 6.6 10/31/2021     (H) 10/31/2021    ALT 77 (H) 10/31/2021    PTT 40.4 (H) 10/28/2021    IN demonstrating delirium tremors since 10/27 with continuation of fluctuation in mood and response to treatment. Patient demonstrated slight worsening in his condition today. Discussed risk and benefit, acknowledging the current symptom and severity.   At Antibody Screen      Rapid SARS-CoV-2 by PCR      Meds This Visit:  Requested Prescriptions     Signed Prescriptions Disp Refills   • aspirin 325 MG Oral Tab 60 tablet 0     Sig: Take 1 tablet (325 mg total) by mouth 2 (two) times daily.    • Senna-Docusate

## 2021-11-03 NOTE — PROGRESS NOTES
Patient is a 62year old   male with history of alcohol dependence, alcoholic cirrhosis liver, BPH and history of renal cancer who presented to the hospital post fall and found with female oral fracture of right ankle s/p open reduction. History  Past Medical History:   Diagnosis Date   • Alcoholic cirrhosis of liver (HCC)    • BPH (benign prostatic hyperplasia)    • Cancer ([facility] [address]    • Disorder of liver    • Esophageal varices ([facility] [address]     without bleeding   • Former smoker    • Heavy drinker Intravenous, Q1H PRN  thiamine (Vitamin B-1) tab 100 mg, 100 mg, Oral, Daily  multivitamin with minerals (ADULT) tab 1 tablet, 1 tablet, Oral, Daily  folic acid (FOLVITE) tab 1 mg, 1 mg, Oral, Daily  hydrALAzine HCl (APRESOLINE) injection 10 mg, 10 mg, Int HCT 34.8 (L) 11/02/2021    PLT 97.0 (L) 11/02/2021    CREATSERUM 0.77 11/02/2021    BUN 9 11/02/2021     11/02/2021    K 3.5 11/02/2021     11/02/2021    CO2 24.0 11/02/2021     (H) 11/02/2021    CA 8.0 (L) 11/02/2021    ALB 2.2 (L) 1 continuous.   Bimalleolar fracture of right ankle, open type I or II, initial encounter  Thrombocytopenia (HCC)  Iron deficiency anemia      The patient starting having delirium tremens Friday 10/27 and continue having waxing and waning with some noticeable Magnesium      Magnesium      Ammonia, Plasma      CBC With Differential With Platelet      Basic Metabolic Panel (8)      Magnesium      CBC With Differential With Platelet      Magnesium      Renal Function Panel      Prothrombin Time (PT)      Type and

## 2021-11-04 PROCEDURE — 99232 SBSQ HOSP IP/OBS MODERATE 35: CPT | Performed by: INTERNAL MEDICINE

## 2021-11-04 PROCEDURE — 99233 SBSQ HOSP IP/OBS HIGH 50: CPT | Performed by: HOSPITALIST

## 2021-11-04 PROCEDURE — 99232 SBSQ HOSP IP/OBS MODERATE 35: CPT | Performed by: OTHER

## 2021-11-04 RX ORDER — POTASSIUM CHLORIDE 1.5 G/1.77G
40 POWDER, FOR SOLUTION ORAL ONCE
Status: COMPLETED | OUTPATIENT
Start: 2021-11-04 | End: 2021-11-04

## 2021-11-04 RX ORDER — ENOXAPARIN SODIUM 100 MG/ML
40 INJECTION SUBCUTANEOUS DAILY
Status: COMPLETED | OUTPATIENT
Start: 2021-11-04 | End: 2021-11-09

## 2021-11-04 RX ORDER — HALOPERIDOL 5 MG/ML
1 INJECTION INTRAMUSCULAR EVERY 6 HOURS SCHEDULED
Status: DISCONTINUED | OUTPATIENT
Start: 2021-11-04 | End: 2021-11-12

## 2021-11-04 RX ORDER — LORAZEPAM 2 MG/ML
1 INJECTION INTRAMUSCULAR EVERY 4 HOURS
Status: DISCONTINUED | OUTPATIENT
Start: 2021-11-04 | End: 2021-11-08

## 2021-11-04 NOTE — PROGRESS NOTES
[facility]  Hospitalist Progress Note     Imani Spears Patient Status:  Inpatient    1960  62year old [facility] [id number]   Location -A Attending Miah Leal MD   Hosp Day # 10 PCP None Pcp     Assessment & Plan:   ---------- 11/03/21  0359   RBC 3.63* 3.51* 3.30*   HGB 12.3* 11.9* 11.3*   HCT 36.0* 34.8* 32.4*   MCV 99.2 99.1 98.2   MCH 33.9 33.9 34.2*   MCHC 34.2 34.2 34.9   RDW 13.5 13.3 13.2   NEPRELIM 4.30 3.47 2.67   WBC 6.2 5.8 5.6   PLT 96.0* 97.0* 105.0*     Recent Lab HYDROcodone-acetaminophen, influenza virus vaccine PF      >35min spent, >50% spent counseling and coordinating care in the form of educating pt/family and d/w consultants and staff.  Most of the time spent discussing plan for continued detox, surgical plan

## 2021-11-04 NOTE — PLAN OF CARE
Problem: Patient Centered Care  Goal: Patient preferences are identified and integrated in the patient's plan of care  Description: Interventions:  - What would you like us to know as we care for you?  \"I want to know where my wife is\"  - Provide timely injury  Description: INTERVENTIONS:  - Assess pt frequently for physical needs  - Identify cognitive and physical deficits and behaviors that affect risk of falls.   - [place] fall precautions as indicated by assessment.  - Educate pt/family on patient sa

## 2021-11-04 NOTE — PAYOR COMM NOTE
--------------  ADMISSION REVIEW     Payor: [employer]  Subscriber #:  [id number]  Authorization Number: [id number]    Admit date: 10/25/21  Admit time:  8:23 PM       Patient Seen in: [facility] Emergency Department      History   Patient presents URINALYSIS WITH CULTURE REFLEX - Abnormal; Notable for the following components:    Blood Urine Small (*)     Urobilinogen Urine 4.0 (*)     Squamous Epi.  Cells Few (*)     Hyaline Casts Present (*)     All other components within normal limits   CBC W/ required for improved long term function. The ankle was attempted to be reduced using traction and manipulation without complications but with little movement. A short leg splint was applied.   Post reduction the patient's neurovascular exam is normal. management. PHYSICAL EXAMINATION:    GENERAL:  Alert and oriented to time, place, and person. Moderate distress. VITAL SIGNS:  Temperature 98.2, pulse 109, respiratory rate 21, blood pressure 153/88, pulse ox 100% on room air. HEENT:  Atraumatic.   Or DAY:  dextrose 5 % and 0.9 % NaCl infusion     Date Action Dose Route User    11/3/2021 2205 New Bag (none) Intravenous Doc Jason, RN      docusate sodium (COLACE) cap 100 mg     Date Action Dose Route User    11/4/2021 3325 Given 100 mg Oral Long Bianca GONZALEZ RN    89/5/2249 1740 Given 25 mg Oral Marycarmen Degroot RN      morphINE sulfate (PF) 2 MG/ML injection 1 mg     Date Action Dose Route User    11/3/2021 2205 Given 1 mg Intravenous Marizol Iqbal RN    11/0/0463 1619 Given 1 mg Intravenous 11/03/21 0800 98.5 °F (36.9 °C) — — — — — — — [facility]    11/03/21 0600 — 87 13 132/84 96 % — None (Room air) —     11/03/21 0500 — 95 14 — 100 % — None (Room air) —     11/03/21 0400 97.7 °F (36.5 °C) 89 19 147/85 90 % — None (Room air) — [facility]    11/03/21 033

## 2021-11-04 NOTE — PROGRESS NOTES
[facility]     Progress Note        Samreen Wade Patient Status:  Inpatient    1960 MRN [id number]   Location [facility] 2W/SW Attending Karol Cai MD   Hosp Day # 10 PCP None Pcp       Subjective:   Patient seen Daily  morphINE sulfate (PF) 2 MG/ML injection 1 mg, 1 mg, Intravenous, Q2H PRN  docusate sodium (COLACE) cap 100 mg, 100 mg, Oral, BID  polyethylene glycol (PEG 3350) (MIRALAX) powder packet 17 g, 17 g, Oral, Daily PRN  magnesium hydroxide (MILK OF YAHIRES antibiotic therapy.  -Now with evidence of worsening signs of EtOH withdrawal/delirium tremens. Appears to be improved from EtOH withdrawal perspective  -Continue CIWA protocol  -Haldol as needed.   -Further recommendations per psych  -Weaned off Precedex

## 2021-11-04 NOTE — PROGRESS NOTES
Patient is a 62year old   male with history of alcohol dependence, alcoholic cirrhosis liver, BPH and history of renal cancer who presented to the hospital post fall and found with female oral fracture of right ankle s/p open reduction. Past Surgical History  Past Surgical History:   Procedure Laterality Date   • IR TIPS PROCEDURE  01/24/2014   • LAPAROSCOPY, SURGICAL; PARTIAL NEPHRECTOMY  02/28/2014   • LIPOMA REMOVAL     • OTHER Right 10/25/2021    RIGHT ANKLE OPEN FRACTURE IRRIG Intravenous, Q2H PRN  docusate sodium (COLACE) cap 100 mg, 100 mg, Oral, BID  polyethylene glycol (PEG 3350) (MIRALAX) powder packet 17 g, 17 g, Oral, Daily PRN  magnesium hydroxide (MILK OF MAGNESIA) 400 MG/5ML suspension 30 mL, 30 mL, Oral, Daily PRN  bi 11/03/2021    MG 1.4 (L) 11/04/2021    PHOS 2.8 11/03/2021    TROP 0.061 (HH) 10/28/2021    B12 1,551 (H) 10/28/2021         Imaging:  No results found.     Vital Signs:     Blood pressure 97/55, pulse 102, temperature 98 °F (36.7 °C), temperature source Te approach:     1. Focus on education and support. 2.  Focus on insight orientation helping the patient understand his medical condition and encourage cooperation. 3.  Continue CIWA protocol.   4.  Change Ativan IV to 1 mg every 4 to eliminate the total do Take 2 tablets by mouth daily. Hold if diarrhea   • HYDROcodone-acetaminophen 5-325 MG Oral Tab 30 tablet 0     Sig: Take 1 tablet by mouth every 4 (four) hours as needed for Pain.    • multivitamin Oral Tab 30 tablet 0     Sig: Take 1 tablet by mouth daily

## 2021-11-04 NOTE — PLAN OF CARE
Patient intermittently fidgety, anxious and diaphoretic throughout shift, looking for his  wife he is easily redirected. Dressings changed to ble with tan/yellow liquid noted to be sorrounding top most incision.  Patient premedicated with morphine to sherry Restraints  Goal: Patient will remain free from self-harm  Description: INTERVENTIONS:  - Apply the least restrictive restraint to prevent harm  - Notify patient and family of reasons restraints applied  - Assess for any contributing factors to confusion (

## 2021-11-04 NOTE — PROGRESS NOTES
[facility]    Progress Note    Glen Antonio Patient Status:  Inpatient    1960 MRN [id number]   Location [facility] 2W/SW Attending Tiffany Murray MD   Hosp Day # 10 PCP None Pcp        Subjective:   [place] is a(n) for next Wednesday, November 10. Continue pin site care and dressing changes. Continue elevation and ice to right ankle. Continue nonweightbearing right lower extremity.       Type I or II open bimalleolar fracture of left ankle, initial encounter

## 2021-11-05 PROCEDURE — 99233 SBSQ HOSP IP/OBS HIGH 50: CPT | Performed by: HOSPITALIST

## 2021-11-05 PROCEDURE — 99232 SBSQ HOSP IP/OBS MODERATE 35: CPT | Performed by: INTERNAL MEDICINE

## 2021-11-05 NOTE — DIETARY NOTE
Dietitian Note:     Met with patient this am. Patient was alert during visit. 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  No issues re: eating per discussion this am. RD assisted jeniffer

## 2021-11-05 NOTE — PROGRESS NOTES
[facility] - [facility]  Hospitalist Progress Note     Nicolas Soliman Patient Status:  Inpatient    1960  62year old CSN [id number]   Location -A Attending Clint Becerra MD   Hosp Day # 11 PCP None Pcp     Assessment & Plan:   ---------- 11.9* 11.3*   HCT 36.0* 34.8* 32.4*   MCV 99.2 99.1 98.2   MCH 33.9 33.9 34.2*   MCHC 34.2 34.2 34.9   RDW 13.5 13.3 13.2   NEPRELIM 4.30 3.47 2.67   WBC 6.2 5.8 5.6   PLT 96.0* 97.0* 105.0*     Recent Labs   Lab 10/30/21  0455 10/30/21  0455 10/31/21  042 bisacodyl, Fleet Enema, melatonin, HYDROmorphone HCl **OR** HYDROmorphone HCl, HYDROcodone-acetaminophen **OR** HYDROcodone-acetaminophen, influenza virus vaccine PF      >35min spent, >50% spent counseling and coordinating care in the form of educating pt

## 2021-11-05 NOTE — PROGRESS NOTES
[facility]     Progress Note        Nurys Aguilera Patient Status:  Inpatient    1960 MRN [id number]   Location [facility] 2W/SW Attending Petr Schulte MD   Hosp Day # 11 PCP None Pcp       Subjective:   Patient seen (COLACE) cap 100 mg, 100 mg, Oral, BID  polyethylene glycol (PEG 3350) (MIRALAX) powder packet 17 g, 17 g, Oral, Daily PRN  magnesium hydroxide (MILK OF MAGNESIA) 400 MG/5ML suspension 30 mL, 30 mL, Oral, Daily PRN  bisacodyl (DULCOLAX) rectal suppository Appears to be improved from EtOH withdrawal perspective  -Continue CIWA protocol  -Haldol as needed.   -Further recommendations per psych  -Weaned off Precedex  -Close monitoring in intensive care unit  -Pain control  -Monitor thrombocytopenia at this time

## 2021-11-05 NOTE — SPIRITUAL CARE NOTE
Pt sitting up in bed with soft restraints Pt watching TV with volume in high volume. Pt is post op on R ankle. Pt was confused, yet the  assisted him with his call light and lowering the volume on his control.   introduced herself and SCT an

## 2021-11-05 NOTE — PLAN OF CARE
Patient is now alert to self, location and situation, takes time to remember, Hospitalist aware. Wrist restraints removed last night, cozy vest still in place. New restraint order placed. Skin and circulations check every 2 hours.  Fluids and bathroom offer Encourage pt to monitor pain and request assistance  - Assess pain using appropriate pain scale  - Administer analgesics based on type and severity of pain and evaluate response  - Implement non-pharmacological measures as appropriate and evaluate response Consider post-discharge preferences of patient/family/discharge partner  - Complete POLST form as appropriate  - Assess patient's ability to be responsible for managing their own health  - Refer to Case Management Department for coordinating discharge plan

## 2021-11-05 NOTE — PLAN OF CARE
Problem: Patient Centered Care  Goal: Patient preferences are identified and integrated in the patient's plan of care  Description: Interventions:  - What would you like us to know as we care for you?  \"I want to know where my wife is\"  - Provide timely limitations  - Instruct pt to call for assistance with activity based on assessment  - Modify environment to reduce risk of injury  - Provide assistive devices as appropriate  - Consider OT/PT consult to assist with strengthening/mobility  - Encourage toil surgery next week.

## 2021-11-06 PROCEDURE — 99233 SBSQ HOSP IP/OBS HIGH 50: CPT | Performed by: HOSPITALIST

## 2021-11-06 NOTE — PHYSICAL THERAPY NOTE
PHYSICAL THERAPY TREATMENT NOTE - INPATIENT     Room Number: 490/817-U       Presenting Problem:  bimalleolar fracture of R ankle- requiring R ankle open fracture irrigation and debridement with placement of external fixator on 10/25/21       Problem List restraints /external fixator )    WEIGHT BEARING RESTRICTION        R Lower Extremity: Non-Weight Bearing       PAIN ASSESSMENT   Rating: Unable to rate  Location: R lower leg  Management Techniques: Activity promotion; Body mechanics; Relaxation;Repositioni mobility at [facility] A   Goal #2 Patient is able to demonstrate transfers EOB to/from Chair/Wheelchair at assistance level: CGA with walker - rolling with RLE NWB      Goal #2  Current Status Min A with Rw   Goal #3 Patient is able to ambulate 20 feet with assi

## 2021-11-06 NOTE — PROGRESS NOTES
[facility]  Hospitalist Progress Note     Corrinne Beath Patient Status:  Inpatient    1960  62year old [facility] [id number]   Location 218218-A Attending Andie Miguel MD   Hosp Day # 12 PCP None Pcp     Assessment & Plan:   ---------- Psych: Unable to assess    Labs:  Recent Labs   Lab 10/31/21  0428 11/02/21  0420 11/03/21  0359   RBC 3.63* 3.51* 3.30*   HGB 12.3* 11.9* 11.3*   HCT 36.0* 34.8* 32.4*   MCV 99.2 99.1 98.2   MCH 33.9 33.9 34.2*   MCHC 34.2 34.2 34.9   RDW 13.5 13.3 13.2 HYDROmorphone HCl, HYDROcodone-acetaminophen **OR** HYDROcodone-acetaminophen, influenza virus vaccine PF      >35min spent, >50% spent counseling and coordinating care in the form of educating pt/family and d/w consultants and staff.  Most of the time spen

## 2021-11-06 NOTE — PLAN OF CARE
Patient disoriented to place (\"hotel\" \"apartment\" \"I don't know\") and situation this evening. Increasingly restless and agitated, managing with scheduled and PRN IV Haldol and IV Ativan.   Was not given scheduled Haldol during the day d/t drowsiness goals for specific interventions  Outcome: Progressing     Problem: PAIN - ADULT  Goal: Verbalizes/displays adequate comfort level or patient's stated pain goal  Description: INTERVENTIONS:  - Encourage pt to monitor pain and request assistance  - Assess p for needed discharge resources and transportation as appropriate  - Identify discharge learning needs (meds, wound care, etc)  - Arrange for interpreters to assist at discharge as needed  - Consider post-discharge preferences of patient/family/discharge pa

## 2021-11-07 PROCEDURE — 99233 SBSQ HOSP IP/OBS HIGH 50: CPT | Performed by: HOSPITALIST

## 2021-11-07 RX ORDER — LACTULOSE 10 G/15ML
20 SOLUTION ORAL 3 TIMES DAILY
Status: DISCONTINUED | OUTPATIENT
Start: 2021-11-07 | End: 2021-11-17

## 2021-11-07 NOTE — PROGRESS NOTES
[facility]  Hospitalist Progress Note     Aryan Jameson Patient Status:  Inpatient    1960  62year old [facility] [id number]   Location -A Attending Teresa Bauer MD   Hosp Day # 13 PCP None Pcp     Assessment & Plan:   ---------- Lab 11/02/21  0420 11/03/21  0359   RBC 3.51* 3.30*   HGB 11.9* 11.3*   HCT 34.8* 32.4*   MCV 99.1 98.2   MCH 33.9 34.2*   MCHC 34.2 34.9   RDW 13.3 13.2   NEPRELIM 3.47 2.67   WBC 5.8 5.6   PLT 97.0* 105.0*     Recent Labs   Lab 11/02/21  0420 11/02/21

## 2021-11-07 NOTE — OCCUPATIONAL THERAPY NOTE
OCCUPATIONAL THERAPY TREATMENT NOTE - INPATIENT    Room Number: 408/408-A               Problem List  Principal Problem:    Bimalleolar fracture of right ankle, open type I or II, initial encounter  Active Problems:    Type I or II open bimalleolar fractur PCT in the chair with chair alarm in place, feet elevated, and tray table positioned in front of patient.        The patient's Approx Degree of Impairment: 59.67% has been calculated based on documentation in the [facility] '6 clicks' Inpatient Daily Activity Sh pivot transfer from bed to chair    BALANCE ASSESSMENT  Static Sitting: SBA  Dynamic Sitting: CGa  Static Standing: CGA  Dynamic Standing:  Mod a    FUNCTIONAL ADL ASSESSMENT  Grooming: Max cues for washing underarms and applying deodorant in supported sitt

## 2021-11-07 NOTE — PLAN OF CARE
Oriented to person and occasionally place during the night. Restless throughout most of shift. Administered PRN and scheduled IV Haldol and IV Ativan per orders. Continuing to monitor CIWA q2h, scores remained =>6 throughout much of shift.   Non-violent goal  Description: INTERVENTIONS:  - Encourage pt to monitor pain and request assistance  - Assess pain using appropriate pain scale  - Administer analgesics based on type and severity of pain and evaluate response  - Implement non-pharmacological measures to assist at discharge as needed  - Consider post-discharge preferences of patient/family/discharge partner  - Complete POLST form as appropriate  - Assess patient's ability to be responsible for managing their own health  - Refer to Case Management Depart

## 2021-11-07 NOTE — PHYSICAL THERAPY NOTE
PHYSICAL THERAPY TREATMENT NOTE - INPATIENT     Room Number: 408/408-A       Presenting Problem:  bimalleolar fracture of R ankle- requiring R ankle open fracture irrigation and debridement with placement of external fixator on 10/25/21    Problem List  Pr required a 2 person to hold up his R LE during his eccentric transfer to sitting in the chair. Pt was repositioned and with his B LE's elevated in the recliner. Handed pt off to the OT in the room. Bed mobility: Mod assist  Transfers:  Mod assist  Gait (including adjusting bedclothes, sheets and blankets)?: A Little   Patient Difficulty: Sitting down on and standing up from a chair with arms (e.g., wheelchair, bedside commode, etc.): A Lot   Patient Difficulty: Moving from lying on back to sitting on the

## 2021-11-07 NOTE — PLAN OF CARE
Patient oriented x 2-4 throughout day, confused at times, impulsive/setting off bed alarm. Non-violent restraint vest in place per order. Restraint and CIWA protocol checked q 2 hour. Patient moved to room closer to nurses station.  IV Ativan/Haldol given p monitor pain and request assistance  - Assess pain using appropriate pain scale  - Administer analgesics based on type and severity of pain and evaluate response  - Implement non-pharmacological measures as appropriate and evaluate response  - Consider cul Progressing

## 2021-11-08 PROCEDURE — 99233 SBSQ HOSP IP/OBS HIGH 50: CPT | Performed by: HOSPITALIST

## 2021-11-08 RX ORDER — POTASSIUM CHLORIDE 20 MEQ/1
40 TABLET, EXTENDED RELEASE ORAL ONCE
Status: COMPLETED | OUTPATIENT
Start: 2021-11-08 | End: 2021-11-08

## 2021-11-08 RX ORDER — LORAZEPAM 2 MG/ML
1 INJECTION INTRAMUSCULAR EVERY 8 HOURS SCHEDULED
Status: DISCONTINUED | OUTPATIENT
Start: 2021-11-09 | End: 2021-11-12

## 2021-11-08 NOTE — PHYSICAL THERAPY NOTE
PHYSICAL THERAPY TREATMENT NOTE - INPATIENT     Room Number: 408/408-A       Presenting Problem:  bimalleolar fracture of R ankle- requiring R ankle open fracture irrigation and debridement with placement of external fixator on 10/25/21    Problem List  Pr of Impairment: 68.66% has been calculated based on documentation in the [facility] '6 clicks' Inpatient Basic Mobility Short Form. Research supports that patients with this level of impairment may benefit from Subacute Rehab.  Handed pt off to the OT in the michelle Degree of Impairment: 68.66%   Standardized Score (AM-PAC Scale): 35.33   CMS Modifier (G-Code): CL        Patient End of Session: Up in chair;Call light within reach;RN aware of session/findings;Bracing education provided; All patient questions and concern

## 2021-11-08 NOTE — PLAN OF CARE
VSS, no acute events overnight, Pt mentation fluctuating throughout shift. CIWA assessments maintained. Voiding in bathroom/incontinent at times. Dressing ACE wrap and external fixator.   Pt having difficulty maintaining non-weight bearing precaution even w severity of pain and evaluate response  - Implement non-pharmacological measures as appropriate and evaluate response  - Consider cultural and social influences on pain and pain management  - Manage/alleviate anxiety  - Utilize distraction and/or relaxatio be responsible for managing their own health  - Refer to Case Management Department for coordinating discharge planning if the patient needs post-hospital services based on physician/LIP order or complex needs related to functional status, cognitive abilit

## 2021-11-08 NOTE — PROGRESS NOTES
[facility]  Hospitalist Progress Note     Collins Palafox Patient Status:  Inpatient    1960  62year old [facility] [id number]   Location -A Attending Vern Slaughter MD   Hosp Day # 14 PCP None Pcp     Assessment & Plan:   ---------- dry. No rashes, erythema, diaphoresis.    Psych: Unable to assess    Labs:  Recent Labs   Lab 11/02/21  0420 11/03/21  0359 11/08/21  0609   RBC 3.51* 3.30* 3.11*   HGB 11.9* 11.3* 10.6*   HCT 34.8* 32.4* 30.6*   MCV 99.1 98.2 98.4   MCH 33.9 34.2* 34.1* magnesium hydroxide, bisacodyl, Fleet Enema, melatonin, HYDROmorphone HCl **OR** HYDROmorphone HCl, HYDROcodone-acetaminophen **OR** HYDROcodone-acetaminophen, influenza virus vaccine PF      >35min spent, >50% spent counseling and coordinating care in the

## 2021-11-08 NOTE — PLAN OF CARE
Patient impulsive intermittently throughout shift. CIWA score reach 9 this evening-PRN Lorazepam administered per order set. IV scheduled Haldol and Lorazepam given (confirmed continuation with Dr. Rox Cornejo). Patient confused at times.  Intermittent visual romo Implement neutropenic guidelines  Outcome: Progressing     Problem: PAIN - ADULT  Goal: Verbalizes/displays adequate comfort level or patient's stated pain goal  Description: INTERVENTIONS:  - Encourage pt to monitor pain and request assistance  - Assess p precautions as indicated by assessment.  - Educate pt/family on patient safety including physical limitations  - Instruct pt to call for assistance with activity based on assessment  - Modify environment to reduce risk of injury  - Provide assistive device

## 2021-11-08 NOTE — PAYOR COMM NOTE
--------------  CONTINUED STAY REVIEW    Payor: [employer]  Subscriber #:  [id number]  Authorization Number: [id number]    Admit date: 10/25/21  Admit time:  8:23 PM    Admitting Physician: Harinder Blue MD  Attending Physician:  Bruno Hein MD  Prim 1 [facility]    11/7/2021 1721 Given 1 mg Intravenous Nilam Jones, RN      metoprolol tartrate (LOPRESSOR) tab 25 mg     Date Action Dose Route User    11/8/2021 0655 Given 25 mg Oral [facility]    11/7/2021 1954 Given 22 m 0600 98.2 °F (36.8 °C) 130 20 122/65 99 % — None (Room air) —     11/07/21 0400 — 128 — — — — — —     11/07/21 0200 — 111 — — — — — —     11/07/21 0100 — 107 — — — — — —     11/07/21 0000 98 °F (36.7 °C) 114 24 135/79 100 % — None (Room air) —  AW    10/30/21 0200 9 AW    10/30/21 0000 12 AW    10/29/21 2200 15 AW    10/29/21 2000 19 AW    10/29/21 1600 21 PZ    10/29/21 1459 15 MW    10/29/21 1402 17 MW    10/29/21 1305 14 MW    10/29/21 1103 3 MW    10/29/21 1000 13 AE    10/29/21 0855 16 MW long-term alcohol use  -Continue scheduled  benzodiazepines per psychiatry  -Cont haldol  -As needed Ativan per detox protocol  -Psychiatry following     Bimalleolar fracture, right ankle, open.   Status post open reduction external fixation, debridement on Recent Labs   Lab 11/03/21  0359 11/08/21  0609   INR 1.97* 1.95*

## 2021-11-08 NOTE — PLAN OF CARE
Patient is alert and oriented to self and situation. CIWA assessment and score every 2 hours has been ranging between 5 to 6. Pt has been calm, alert, and drowsy throughout the am. No complaints of nausea or vomiting. No complaints of pain. On IV fluids.  P management  - Manage/alleviate anxiety  - Utilize distraction and/or relaxation techniques  - Monitor for opioid side effects  - Notify MD/LIP if interventions unsuccessful or patient reports new pain  - Anticipate increased pain with activity and pre-medi physician/LIP order or complex needs related to functional status, cognitive ability or social support system  Outcome: Progressing     Problem: Delirium  Goal: Minimize duration of delirium  Description: Interventions:  - Encourage use of hearing aids, ey

## 2021-11-08 NOTE — DIETARY NOTE
Nutrition Re-screen    Pt seen by RD d/t LOS. Pt not at nutrition risk. Pt with good appetite and PO intake >65%. No n/v reported per pt/chart review. Skin intact. Will follow up per protocol and monitor as needed.      Wt Readings from Last 5 Encounters:

## 2021-11-09 ENCOUNTER — APPOINTMENT (OUTPATIENT)
Dept: GENERAL RADIOLOGY | Facility: HOSPITAL | Age: 61
DRG: 492 | End: 2021-11-09
Attending: HOSPITALIST
Payer: OTHER MISCELLANEOUS

## 2021-11-09 PROCEDURE — 99233 SBSQ HOSP IP/OBS HIGH 50: CPT | Performed by: HOSPITALIST

## 2021-11-09 PROCEDURE — 30233K1 TRANSFUSION OF NONAUTOLOGOUS FROZEN PLASMA INTO PERIPHERAL VEIN, PERCUTANEOUS APPROACH: ICD-10-PCS | Performed by: HOSPITALIST

## 2021-11-09 PROCEDURE — 71045 X-RAY EXAM CHEST 1 VIEW: CPT | Performed by: HOSPITALIST

## 2021-11-09 RX ORDER — SODIUM CHLORIDE 9 MG/ML
INJECTION, SOLUTION INTRAVENOUS ONCE
Status: COMPLETED | OUTPATIENT
Start: 2021-11-09 | End: 2021-11-13

## 2021-11-09 NOTE — CM/SW NOTE
JEAN CARLOS followed up on DC planning. Pt scheduled for surgery here tomorrow. Pt was originally planning to DC to the [place] [address].  Would need New PT/OT notes after surgery and a re-evaluation pending progress    Insurance Auth needed for Rehab    PLAN:

## 2021-11-09 NOTE — PHYSICAL THERAPY NOTE
PHYSICAL THERAPY TREATMENT NOTE - INPATIENT     Room Number: 408/408-A       Presenting Problem:  bimalleolar fracture of R ankle- requiring R ankle open fracture irrigation and debridement with placement of external fixator on 10/25/21    Problem List  Pr Sub-acute rehabilitation     PLAN  PT Treatment Plan: Bed mobility; Endurance; Patient education    SUBJECTIVE  Still groggy    OBJECTIVE  Precautions: Bed/chair alarm    WEIGHT BEARING RESTRICTION  Weight Bearing Restriction: R lower extremity        RUPERTO Hair GOALS       Patient Goal Patient's self-stated goal is: return to PLOF   Goal #1 Patient is able to demonstrate supine - sit EOB @ level: supervision      Goal #1   Current Status Pt performs bed mobility at Mod A   Goal #2 Patient is able to demonstrate t

## 2021-11-09 NOTE — PROGRESS NOTES
[facility]  Hospitalist Progress Note     Collins Palafox Patient Status:  Inpatient    1960  62year old [facility] [id number]   Location -A Attending Vern Slaughter MD   Hosp Day # 15 PCP None Pcp     Assessment & Plan:   ---------- joint effusions. No peripheral edema. External fixation device intact  Skin: Skin is warm and dry. No rashes, erythema, diaphoresis.    Psych: Unable to assess    Labs:  Recent Labs   Lab 11/03/21  0359 11/08/21  0609   RBC 3.30* 3.11*   HGB 11.3* 10.6* HYDROcodone-acetaminophen, influenza virus vaccine PF      >35min spent, >50% spent counseling and coordinating care in the form of educating pt/family and d/w consultants and staff.  Most of the time spent discussing plan for FFP prior to surgery, anticipa

## 2021-11-09 NOTE — PLAN OF CARE
Soft wrist restraints and [place] Vest in place due to confusion.   Problem: Patient Centered Care  Goal: Patient preferences are identified and integrated in the patient's plan of care  Description: Interventions:  - What would you like us to know as we care

## 2021-11-10 ENCOUNTER — ANESTHESIA EVENT (OUTPATIENT)
Dept: SURGERY | Facility: HOSPITAL | Age: 61
DRG: 492 | End: 2021-11-10
Payer: OTHER MISCELLANEOUS

## 2021-11-10 ENCOUNTER — ANESTHESIA (OUTPATIENT)
Dept: SURGERY | Facility: HOSPITAL | Age: 61
DRG: 492 | End: 2021-11-10
Payer: OTHER MISCELLANEOUS

## 2021-11-10 ENCOUNTER — APPOINTMENT (OUTPATIENT)
Dept: GENERAL RADIOLOGY | Facility: HOSPITAL | Age: 61
DRG: 492 | End: 2021-11-10
Attending: ORTHOPAEDIC SURGERY
Payer: OTHER MISCELLANEOUS

## 2021-11-10 PROCEDURE — 99233 SBSQ HOSP IP/OBS HIGH 50: CPT | Performed by: HOSPITALIST

## 2021-11-10 PROCEDURE — 0QSJ04Z REPOSITION RIGHT FIBULA WITH INTERNAL FIXATION DEVICE, OPEN APPROACH: ICD-10-PCS | Performed by: ORTHOPAEDIC SURGERY

## 2021-11-10 PROCEDURE — 99233 SBSQ HOSP IP/OBS HIGH 50: CPT | Performed by: OTHER

## 2021-11-10 PROCEDURE — 76000 FLUOROSCOPY <1 HR PHYS/QHP: CPT | Performed by: ORTHOPAEDIC SURGERY

## 2021-11-10 PROCEDURE — B41F1ZZ FLUOROSCOPY OF RIGHT LOWER EXTREMITY ARTERIES USING LOW OSMOLAR CONTRAST: ICD-10-PCS | Performed by: ORTHOPAEDIC SURGERY

## 2021-11-10 PROCEDURE — 0QPJ05Z REMOVAL OF EXTERNAL FIXATION DEVICE FROM RIGHT FIBULA, OPEN APPROACH: ICD-10-PCS | Performed by: ORTHOPAEDIC SURGERY

## 2021-11-10 PROCEDURE — 30233R1 TRANSFUSION OF NONAUTOLOGOUS PLATELETS INTO PERIPHERAL VEIN, PERCUTANEOUS APPROACH: ICD-10-PCS | Performed by: HOSPITALIST

## 2021-11-10 DEVICE — IMPLANTABLE DEVICE: Type: IMPLANTABLE DEVICE | Site: ANKLE | Status: FUNCTIONAL

## 2021-11-10 DEVICE — SCREW BN 2.7MM 2.1MM 14MM LCP: Type: IMPLANTABLE DEVICE | Site: ANKLE | Status: FUNCTIONAL

## 2021-11-10 DEVICE — PLATE LCP TUB 1/3 6H 241.361: Type: IMPLANTABLE DEVICE | Site: ANKLE | Status: FUNCTIONAL

## 2021-11-10 DEVICE — SCREW BN 2.7MM 2.1MM 16MM LCP: Type: IMPLANTABLE DEVICE | Site: ANKLE | Status: FUNCTIONAL

## 2021-11-10 DEVICE — SCREW BN 2.7MM 18MM LCP SS: Type: IMPLANTABLE DEVICE | Site: ANKLE | Status: FUNCTIONAL

## 2021-11-10 RX ORDER — ONDANSETRON 2 MG/ML
4 INJECTION INTRAMUSCULAR; INTRAVENOUS ONCE AS NEEDED
Status: DISCONTINUED | OUTPATIENT
Start: 2021-11-10 | End: 2021-11-10 | Stop reason: HOSPADM

## 2021-11-10 RX ORDER — METOPROLOL TARTRATE 5 MG/5ML
2.5 INJECTION INTRAVENOUS ONCE
Status: DISCONTINUED | OUTPATIENT
Start: 2021-11-10 | End: 2021-11-10 | Stop reason: HOSPADM

## 2021-11-10 RX ORDER — CEFAZOLIN SODIUM/WATER 2 G/20 ML
2 SYRINGE (ML) INTRAVENOUS EVERY 8 HOURS
Status: COMPLETED | OUTPATIENT
Start: 2021-11-10 | End: 2021-11-11

## 2021-11-10 RX ORDER — ONDANSETRON 2 MG/ML
INJECTION INTRAMUSCULAR; INTRAVENOUS AS NEEDED
Status: DISCONTINUED | OUTPATIENT
Start: 2021-11-10 | End: 2021-11-10 | Stop reason: SURG

## 2021-11-10 RX ORDER — HYDROMORPHONE HYDROCHLORIDE 1 MG/ML
0.6 INJECTION, SOLUTION INTRAMUSCULAR; INTRAVENOUS; SUBCUTANEOUS EVERY 5 MIN PRN
Status: DISCONTINUED | OUTPATIENT
Start: 2021-11-10 | End: 2021-11-10 | Stop reason: HOSPADM

## 2021-11-10 RX ORDER — MORPHINE SULFATE 10 MG/ML
6 INJECTION, SOLUTION INTRAMUSCULAR; INTRAVENOUS EVERY 10 MIN PRN
Status: DISCONTINUED | OUTPATIENT
Start: 2021-11-10 | End: 2021-11-10 | Stop reason: HOSPADM

## 2021-11-10 RX ORDER — CEFAZOLIN SODIUM/WATER 2 G/20 ML
SYRINGE (ML) INTRAVENOUS AS NEEDED
Status: DISCONTINUED | OUTPATIENT
Start: 2021-11-10 | End: 2021-11-10 | Stop reason: SURG

## 2021-11-10 RX ORDER — HYDROMORPHONE HYDROCHLORIDE 1 MG/ML
INJECTION, SOLUTION INTRAMUSCULAR; INTRAVENOUS; SUBCUTANEOUS AS NEEDED
Status: DISCONTINUED | OUTPATIENT
Start: 2021-11-10 | End: 2021-11-10 | Stop reason: SURG

## 2021-11-10 RX ORDER — MIDAZOLAM HYDROCHLORIDE 1 MG/ML
INJECTION INTRAMUSCULAR; INTRAVENOUS AS NEEDED
Status: DISCONTINUED | OUTPATIENT
Start: 2021-11-10 | End: 2021-11-10 | Stop reason: SURG

## 2021-11-10 RX ORDER — ROCURONIUM BROMIDE 10 MG/ML
INJECTION, SOLUTION INTRAVENOUS AS NEEDED
Status: DISCONTINUED | OUTPATIENT
Start: 2021-11-10 | End: 2021-11-10 | Stop reason: SURG

## 2021-11-10 RX ORDER — HYDROMORPHONE HYDROCHLORIDE 1 MG/ML
0.4 INJECTION, SOLUTION INTRAMUSCULAR; INTRAVENOUS; SUBCUTANEOUS EVERY 5 MIN PRN
Status: DISCONTINUED | OUTPATIENT
Start: 2021-11-10 | End: 2021-11-10 | Stop reason: HOSPADM

## 2021-11-10 RX ORDER — SODIUM CHLORIDE 9 MG/ML
INJECTION, SOLUTION INTRAVENOUS CONTINUOUS PRN
Status: DISCONTINUED | OUTPATIENT
Start: 2021-11-10 | End: 2021-11-10 | Stop reason: SURG

## 2021-11-10 RX ORDER — ENOXAPARIN SODIUM 100 MG/ML
40 INJECTION SUBCUTANEOUS DAILY
Status: DISCONTINUED | OUTPATIENT
Start: 2021-11-11 | End: 2021-11-24

## 2021-11-10 RX ORDER — EPHEDRINE SULFATE 50 MG/ML
INJECTION, SOLUTION INTRAVENOUS AS NEEDED
Status: DISCONTINUED | OUTPATIENT
Start: 2021-11-10 | End: 2021-11-10 | Stop reason: SURG

## 2021-11-10 RX ORDER — MORPHINE SULFATE 4 MG/ML
2 INJECTION, SOLUTION INTRAMUSCULAR; INTRAVENOUS EVERY 10 MIN PRN
Status: DISCONTINUED | OUTPATIENT
Start: 2021-11-10 | End: 2021-11-10 | Stop reason: HOSPADM

## 2021-11-10 RX ORDER — MORPHINE SULFATE 4 MG/ML
4 INJECTION, SOLUTION INTRAMUSCULAR; INTRAVENOUS EVERY 10 MIN PRN
Status: DISCONTINUED | OUTPATIENT
Start: 2021-11-10 | End: 2021-11-10 | Stop reason: HOSPADM

## 2021-11-10 RX ORDER — HYDROCODONE BITARTRATE AND ACETAMINOPHEN 5; 325 MG/1; MG/1
1 TABLET ORAL AS NEEDED
Status: DISCONTINUED | OUTPATIENT
Start: 2021-11-10 | End: 2021-11-10 | Stop reason: HOSPADM

## 2021-11-10 RX ORDER — HYDROMORPHONE HYDROCHLORIDE 1 MG/ML
0.2 INJECTION, SOLUTION INTRAMUSCULAR; INTRAVENOUS; SUBCUTANEOUS EVERY 5 MIN PRN
Status: DISCONTINUED | OUTPATIENT
Start: 2021-11-10 | End: 2021-11-10 | Stop reason: HOSPADM

## 2021-11-10 RX ORDER — INSULIN ASPART 100 [IU]/ML
INJECTION, SOLUTION INTRAVENOUS; SUBCUTANEOUS ONCE
Status: DISCONTINUED | OUTPATIENT
Start: 2021-11-10 | End: 2021-11-10 | Stop reason: HOSPADM

## 2021-11-10 RX ORDER — NALOXONE HYDROCHLORIDE 0.4 MG/ML
80 INJECTION, SOLUTION INTRAMUSCULAR; INTRAVENOUS; SUBCUTANEOUS AS NEEDED
Status: DISCONTINUED | OUTPATIENT
Start: 2021-11-10 | End: 2021-11-10 | Stop reason: HOSPADM

## 2021-11-10 RX ORDER — GLYCOPYRROLATE 0.2 MG/ML
INJECTION, SOLUTION INTRAMUSCULAR; INTRAVENOUS AS NEEDED
Status: DISCONTINUED | OUTPATIENT
Start: 2021-11-10 | End: 2021-11-10 | Stop reason: SURG

## 2021-11-10 RX ORDER — OLANZAPINE 5 MG/1
5 TABLET ORAL NIGHTLY
Status: DISCONTINUED | OUTPATIENT
Start: 2021-11-10 | End: 2021-11-16

## 2021-11-10 RX ORDER — DEXAMETHASONE SODIUM PHOSPHATE 4 MG/ML
VIAL (ML) INJECTION AS NEEDED
Status: DISCONTINUED | OUTPATIENT
Start: 2021-11-10 | End: 2021-11-10

## 2021-11-10 RX ORDER — NEOSTIGMINE METHYLSULFATE 1 MG/ML
INJECTION INTRAVENOUS AS NEEDED
Status: DISCONTINUED | OUTPATIENT
Start: 2021-11-10 | End: 2021-11-10 | Stop reason: SURG

## 2021-11-10 RX ORDER — PHENYLEPHRINE HCL 10 MG/ML
VIAL (ML) INJECTION AS NEEDED
Status: DISCONTINUED | OUTPATIENT
Start: 2021-11-10 | End: 2021-11-10 | Stop reason: SURG

## 2021-11-10 RX ORDER — LIDOCAINE HYDROCHLORIDE 10 MG/ML
INJECTION, SOLUTION EPIDURAL; INFILTRATION; INTRACAUDAL; PERINEURAL AS NEEDED
Status: DISCONTINUED | OUTPATIENT
Start: 2021-11-10 | End: 2021-11-10 | Stop reason: SURG

## 2021-11-10 RX ORDER — HYDROCODONE BITARTRATE AND ACETAMINOPHEN 5; 325 MG/1; MG/1
2 TABLET ORAL AS NEEDED
Status: DISCONTINUED | OUTPATIENT
Start: 2021-11-10 | End: 2021-11-10 | Stop reason: HOSPADM

## 2021-11-10 RX ORDER — SODIUM CHLORIDE, SODIUM LACTATE, POTASSIUM CHLORIDE, CALCIUM CHLORIDE 600; 310; 30; 20 MG/100ML; MG/100ML; MG/100ML; MG/100ML
INJECTION, SOLUTION INTRAVENOUS CONTINUOUS
Status: DISCONTINUED | OUTPATIENT
Start: 2021-11-10 | End: 2021-11-10 | Stop reason: HOSPADM

## 2021-11-10 RX ADMIN — CEFAZOLIN SODIUM/WATER 2 G: 2 G/20 ML SYRINGE (ML) INTRAVENOUS at 12:40:00

## 2021-11-10 RX ADMIN — NEOSTIGMINE METHYLSULFATE 4 MG: 1 INJECTION INTRAVENOUS at 14:36:00

## 2021-11-10 RX ADMIN — PHENYLEPHRINE HCL 100 MCG: 10 MG/ML VIAL (ML) INJECTION at 12:33:00

## 2021-11-10 RX ADMIN — SODIUM CHLORIDE: 9 INJECTION, SOLUTION INTRAVENOUS at 12:15:00

## 2021-11-10 RX ADMIN — GLYCOPYRROLATE 0.8 MG: 0.2 INJECTION, SOLUTION INTRAMUSCULAR; INTRAVENOUS at 14:36:00

## 2021-11-10 RX ADMIN — MIDAZOLAM HYDROCHLORIDE 2 MG: 1 INJECTION INTRAMUSCULAR; INTRAVENOUS at 12:15:00

## 2021-11-10 RX ADMIN — EPHEDRINE SULFATE 5 MG: 50 INJECTION, SOLUTION INTRAVENOUS at 12:50:00

## 2021-11-10 RX ADMIN — PHENYLEPHRINE HCL 100 MCG: 10 MG/ML VIAL (ML) INJECTION at 15:02:00

## 2021-11-10 RX ADMIN — HYDROMORPHONE HYDROCHLORIDE 0.5 MG: 1 INJECTION, SOLUTION INTRAMUSCULAR; INTRAVENOUS; SUBCUTANEOUS at 14:39:00

## 2021-11-10 RX ADMIN — PHENYLEPHRINE HCL 100 MCG: 10 MG/ML VIAL (ML) INJECTION at 14:53:00

## 2021-11-10 RX ADMIN — EPHEDRINE SULFATE 5 MG: 50 INJECTION, SOLUTION INTRAVENOUS at 12:28:00

## 2021-11-10 RX ADMIN — PHENYLEPHRINE HCL 100 MCG: 10 MG/ML VIAL (ML) INJECTION at 12:23:00

## 2021-11-10 RX ADMIN — SODIUM CHLORIDE: 9 INJECTION, SOLUTION INTRAVENOUS at 15:14:00

## 2021-11-10 RX ADMIN — EPHEDRINE SULFATE 10 MG: 50 INJECTION, SOLUTION INTRAVENOUS at 12:25:00

## 2021-11-10 RX ADMIN — ROCURONIUM BROMIDE 50 MG: 10 INJECTION, SOLUTION INTRAVENOUS at 12:18:00

## 2021-11-10 RX ADMIN — PHENYLEPHRINE HCL 100 MCG: 10 MG/ML VIAL (ML) INJECTION at 12:53:00

## 2021-11-10 RX ADMIN — ONDANSETRON 4 MG: 2 INJECTION INTRAMUSCULAR; INTRAVENOUS at 14:38:00

## 2021-11-10 RX ADMIN — PHENYLEPHRINE HCL 100 MCG: 10 MG/ML VIAL (ML) INJECTION at 12:50:00

## 2021-11-10 RX ADMIN — LIDOCAINE HYDROCHLORIDE 50 MG: 10 INJECTION, SOLUTION EPIDURAL; INFILTRATION; INTRACAUDAL; PERINEURAL at 12:17:00

## 2021-11-10 RX ADMIN — HYDROMORPHONE HYDROCHLORIDE 0.5 MG: 1 INJECTION, SOLUTION INTRAMUSCULAR; INTRAVENOUS; SUBCUTANEOUS at 14:05:00

## 2021-11-10 RX ADMIN — SODIUM CHLORIDE: 9 INJECTION, SOLUTION INTRAVENOUS at 14:28:00

## 2021-11-10 RX ADMIN — PHENYLEPHRINE HCL 100 MCG: 10 MG/ML VIAL (ML) INJECTION at 12:25:00

## 2021-11-10 RX ADMIN — PHENYLEPHRINE HCL 100 MCG: 10 MG/ML VIAL (ML) INJECTION at 12:40:00

## 2021-11-10 NOTE — PROGRESS NOTES
Patient is a 62year old   male with history of alcohol dependence, alcoholic cirrhosis liver, BPH and history of renal cancer who presented to the hospital post fall and found with female oral fracture of right ankle s/p open reduction. is heavy in alcohol abuse. Amount not been specified.       Medical History:       Past Medical History  Past Medical History:   Diagnosis Date   • Alcoholic cirrhosis of liver (HCC)    • BPH (benign prostatic hyperplasia)    • Cancer ([facility] [address]    • Disorder o injection 50 mcg, 50 mcg, Intravenous, Q5 Min PRN  HYDROmorphone HCl (DILAUDID) 1 MG/ML injection 0.2 mg, 0.2 mg, Intravenous, Q5 Min PRN  HYDROmorphone HCl (DILAUDID) 1 MG/ML injection 0.4 mg, 0.4 mg, Intravenous, Q5 Min PRN  HYDROmorphone HCl (DILAUDID) 650 mg, Oral, Q6H PRN  [MAR Hold] tamsulosin (FLOMAX) cap 0.4 mg, 0.4 mg, Oral, Daily  [MAR Hold] morphINE sulfate (PF) 2 MG/ML injection 1 mg, 1 mg, Intravenous, Q2H PRN  [MAR Hold] docusate sodium (COLACE) cap 100 mg, 100 mg, Oral, BID  [MAR Hold] polyet CO2 24.0 11/08/2021     (H) 11/08/2021    CA 8.1 (L) 11/08/2021    ALB 1.9 (L) 11/08/2021    ALKPHO 105 11/08/2021    TP 6.2 (L) 11/08/2021    AST 56 (H) 11/08/2021    ALT 46 11/08/2021    PTT 40.4 (H) 10/28/2021    INR 1.95 (H) 11/08/2021    PTP 22 stimuli.   Concentration: Continue to be somewhat impaired due to his distractibility  Memory: Impaired due to recent delirium  Intellect: Limited due to his confusion  Judgment and Insight: Limited due to his poor and fluctuation in his cognitive function Ferritin      Vitamin B12 with Reflex to MMA      Vitamin B12 with reflex to MMA      Reticulocyte Count      Magnesium      Ammonia, Plasma      CBC With Differential With Platelet      Renal Function Panel      CBC With Differential With Platelet      Ma

## 2021-11-10 NOTE — ANESTHESIA PROCEDURE NOTES
Peripheral IV  Date/Time: 11/10/2021 12:26 PM  Inserted by: Radha Phoenix MD    Placement  Needle size: 16 G  Laterality: left  Location: wrist  Site prep: alcohol  Technique: anatomical landmarks  Attempts: 1

## 2021-11-10 NOTE — PHYSICAL THERAPY NOTE
PHYSICAL THERAPY TREATMENT NOTE - INPATIENT     Room Number: 408/408-A       Presenting Problem:  bimalleolar fracture of R ankle- requiring R ankle open fracture irrigation and debridement with placement of external fixator on 10/25/21    Problem List  Pr session. DISCHARGE RECOMMENDATIONS  PT Discharge Recommendations: Sub-acute rehabilitation     PLAN  PT Treatment Plan: Bed mobility; Endurance; Patient education;Gait training    SUBJECTIVE  Still groggy    OBJECTIVE  Precautions: Bed/chair alarm    [facility] session/findings;Bracing education provided; All patient questions and concerns addressed    CURRENT GOALS       Patient Goal Patient's self-stated goal is: return to PLOF   Goal #1 Patient is able to demonstrate supine - sit EOB @ level: supervision      G

## 2021-11-10 NOTE — PROGRESS NOTES
[facility]    Progress Note    Nicolasyenifer Soliman Patient Status:  Inpatient    1960 MRN [id number]   Location [facility] 4W/SW/SE Attending Irina Barr MD   Hosp Day # 16 PCP None Pcp       Subjective:   Nicolas Soliman i mg, Oral, Q1H PRN **OR** LORazepam (ATIVAN) injection 2 mg, 2 mg, Intravenous, Q1H PRN  •  thiamine (Vitamin B-1) tab 100 mg, 100 mg, Oral, Daily  •  multivitamin with minerals (ADULT) tab 1 tablet, 1 tablet, Oral, Daily  •  folic acid (FOLVITE) tab 1 mg, following     Bimalleolar fracture, right ankle, open. Status post open reduction external fixation, debridement on 10/25.  -will need ORIF today  -Orthopedics following  -Pain control     Coagulopathy. Due to liver.   -FFP today prior to surgery     Rigoberto May

## 2021-11-10 NOTE — OCCUPATIONAL THERAPY NOTE
PT not seen for OT at this time secondary to pt off of floor for surgery to remove external fixator. Will need to OT orders to see pt.

## 2021-11-10 NOTE — ANESTHESIA PROCEDURE NOTES
Airway  Date/Time: 11/10/2021 12:20 PM  Urgency: elective    Airway not difficult    General Information and Staff    Patient location during procedure: OR  Anesthesiologist: Airam Degroot MD  Performed: anesthesiologist     Indications and Patient Con

## 2021-11-10 NOTE — PROGRESS NOTES
[facility]    Progress Note    Imani Spears Patient Status:  Inpatient    1960 MRN [id number]   Location [facility] 4W/SW/SE Attending Suraj Ortiz MD   Hosp Day # 16 PCP None Pcp        Subjective:   Imani Spears anemia  Stable, continue to monitor      Delirium tremens [facility])  improving      Episodic mood disorder [facility])  Per psych      Alcohol dependence, continuous ([facility] [address]  Per psych            Results:     Lab Results   Component Value Date    WBC 7.1 11/08/2021

## 2021-11-10 NOTE — ANESTHESIA PREPROCEDURE EVALUATION
Anesthesia PreOp Note    HPI:     Ana Thomas is a 62year old male who presents for preoperative consultation requested by: Cassandra Edmonds MD    Date of Surgery: 10/25/2021 - 11/10/2021    Procedure(s):  RIGHT ANKLE FRACTURE EXTERNAL FIXATION REM MG Oral Tab, Take 20 mg by mouth daily. , Disp: , Rfl: , Past Week at Unknown time  lactulose 20 GM/30ML Oral Solution, Take 30 mL by mouth 3 (three) times daily. , Disp: , Rfl: , Unknown at Unknown time  nebivolol 10 MG Oral Tab, Take 10 mg by mouth daily. , mg, Oral, Q1H PRN, Manuel Coronado MD   Or  Leonila Ni Hold] LORazepam (ATIVAN) injection 2 mg, 2 mg, Intravenous, Q1H PRN, Edward Poole MD, 1 mg at 11/08/21 2142  [MAR Hold] thiamine (Vitamin B-1) tab 100 mg, 100 mg, Oral, Daily, Pau Poole 0.8 mg at 11/04/21 0430  [MAR Hold] HYDROcodone-acetaminophen (NORCO) 5-325 MG per tab 1 tablet, 1 tablet, Oral, Q4H PRN, Opal Graham MD   Or  Latricia Sampsonon Hold] HYDROcodone-acetaminophen (NORCO)  MG per tab 1 tablet, 1 tablet, Oral, Q4H PRN, [place] on file  Housing Stability: Not on file    Available pre-op labs reviewed.   Lab Results   Component Value Date    WBC 7.1 11/08/2021    RBC 3.11 (L) 11/08/2021    HGB 10.6 (L) 11/08/2021    HCT 30.6 (L) 11/08/2021    MCV 98.4 11/08/2021    MCH 34.1 (H) 11/ abnormalities. Features are consistent with a pseudonormal left ventricular filling pattern, with concomitant abnormal relaxation and increased filling pressure (grade 2 diastolic dysfunction). 2. Aortic valve: Mildly calcified annulus.  [place]; mildl Earnie Pale and/or legal guardian or family member of the nature of the anesthetic plan, benefits, risks including possible dental damage if relevant, major complications, and any alternative forms of anesthetic management.    All of the patient's questions w

## 2021-11-10 NOTE — OPERATIVE REPORT
[facility] OPERATING ROOM  Operative Note     Danis Candelaria Location: OR   [facility] [id number] MRN [id number]   Admission Date 10/25/2021 Operation Date 11/10/2021   Attending Physician Connie Hewitt MD Operating Physician Sahra Copeland MD [phone]   [employer]gie[employer] List NA Right 2 Implanted   PLATE 9CM SS 7.2/0.4AV SCR 4 - [id number]  PLATE 9CM SS 6.1/2.9FX SCR 4   [employer] NA Right 1 Implanted   SCREW ZACH STAP 3.5X14 204.814 - [id number] Screw SCREW ZACH STAP 3.5X14 204.814  [facility] SYN fracture site. Careful dissection was carried down to the fracture using tenotomy scissors creating full-thickness skin flaps. Consolidating hematoma was removed from the fracture site using a combination of irrigation and a small curette.   The distal fi with instructions to ice and elevate the right lower extremity. Follow-up in 2 weeks for splint and staple removal and transition to a cam boot.         Shyam Balbuena MD  11/10/2021  2:55 PM

## 2021-11-10 NOTE — PLAN OF CARE
Patient is alert and oriented to self. CIWA score done every 2 hr. Pt is calm and alert. Would care done. Using urinal and had incontinent episode. No BM. Surgery scheduled on 11/10. Bed alarm on. Call light in reach.       Problem: Patient Centered Care interventions unsuccessful or patient reports new pain  - Anticipate increased pain with activity and pre-medicate as appropriate  Outcome: Progressing     Problem: RISK FOR INFECTION - ADULT  Goal: Absence of fever/infection during anticipated neutropenic Delirium  Goal: Minimize duration of delirium  Description: Interventions:  - Encourage use of hearing aids, eye glasses  - Promote highest level of mobility daily  - Provide frequent reorientation  - Promote wakefulness i.e. lights on, blinds open  - Prom

## 2021-11-10 NOTE — ANESTHESIA POSTPROCEDURE EVALUATION
Patient: Amilcar Ng    Procedure Summary     Date: 11/10/21 Room / Location: [address] [place] [address] OR 11 / [address] [place] [address] OR    Anesthesia Start: 9284 Anesthesia Stop: 2816    Procedure: RIGHT ANKLE FRACTURE EXTERNAL FIXATION REMOVAL AND OPEN REDUCTION INTERNAL FIXATION

## 2021-11-11 PROCEDURE — 99233 SBSQ HOSP IP/OBS HIGH 50: CPT | Performed by: HOSPITALIST

## 2021-11-11 RX ORDER — POTASSIUM CHLORIDE 20 MEQ/1
40 TABLET, EXTENDED RELEASE ORAL ONCE
Status: COMPLETED | OUTPATIENT
Start: 2021-11-11 | End: 2021-11-11

## 2021-11-11 RX ORDER — MAGNESIUM OXIDE 400 MG (241.3 MG MAGNESIUM) TABLET
800 TABLET ONCE
Status: COMPLETED | OUTPATIENT
Start: 2021-11-11 | End: 2021-11-11

## 2021-11-11 NOTE — PLAN OF CARE
Problem: Patient Centered Care  Goal: Patient preferences are identified and integrated in the patient's plan of care  Description: Interventions:  - What would you like us to know as we care for you?  \"I want to know where my wife is\"  - Provide timely fever/infection during anticipated neutropenic period  Description: INTERVENTIONS  - Monitor WBC  - Administer growth factors as ordered  - Implement neutropenic guidelines  Outcome: Progressing     Problem: SAFETY ADULT - FALL  Goal: Free from fall injury wakefulness i.e. lights on, blinds open  - Promote sleep, encourage patient's normal rest cycle i.e. lights off, TV off, minimize noise and interruptions  - Encourage family to assist in orientation and promotion of home routines  Outcome: Progressing

## 2021-11-11 NOTE — PHYSICAL THERAPY NOTE
PHYSICAL THERAPY RE- EVALUATION - INPATIENT     Room Number: 408/408-A  Evaluation Date: 11/11/2021  Type of Evaluation: Initial   Physician Order:  (Re-eval post sx ) 11/10/21     Presenting Problem: Open ankle fx with I and D with External fixator on 10 Patient with poor carryover. Bed mobility: Mod assist  Transfers: 2-3 person assist used this session as below for strict compliance to non WB Status for right LE .   Sit to stand mod assist of 2 with RW ,  Pt stood less then 45 sec 1 min with RW with deficits in preparation for discharge. DISCHARGE RECOMMENDATIONS  PT Discharge Recommendations: Sub-acute rehabilitation    PLAN  PT Treatment Plan: Bed mobility; Endurance; Patient education;Gait training  Rehab Potential : Good  Frequency (Obs): Daily significance  Cirrhosis  Thrombocytopenia  Alcohol abuse  Jaundice     dvt prophylaxis: sc heparin  code status: full code  [facility] :        Problem List  Principal Problem:    Bimalleolar fracture of right ankle, open type Non-Weight Bearing       PAIN ASSESSMENT  Rating: Other (Comment)  Location: moderate pain at right ankle area sx site   Management Techniques: Activity promotion; Body mechanics;Breathing techniques;Relaxation;Repositioning    COGNITION  · Overall Cognitiv goal for rehab    Goal #1 Patient is able to demonstrate supine - sit EOB @ level: supervision     Goal #1   Current Status    Goal #2 Patient is able to demonstrate transfers EOB to/from Chair/Wheelchair at assistance level: minimum assistance with walker

## 2021-11-11 NOTE — PROGRESS NOTES
[facility]    Progress Note    Tarikrinne Beatbrittney Patient Status:  Inpatient    1960 MRN [id number]   Location [facility] 4W/SW/SE Attending Ebony Little MD   Hosp Day # 16 PCP None Pcp        Subjective:   Corrinne Neal Date    WBC 4.0 11/11/2021    HGB 8.9 (L) 11/11/2021    HCT 25.3 (L) 11/11/2021    PLT 83.0 (L) 11/11/2021    CREATSERUM 0.91 11/11/2021    BUN 9 11/11/2021     11/11/2021    K 3.2 (L) 11/11/2021     11/11/2021    CO2 21.0 11/11/2021

## 2021-11-11 NOTE — PROGRESS NOTES
[facility]    Progress Note    Bertha Fuller Patient Status:  Inpatient    1960 MRN [id number]   Location [facility] 4W/SW/SE Attending Silvina Odell MD   Hosp Day # 16 PCP None Pcp       Subjective:   Bertha Fuller i **OR** LORazepam (ATIVAN) injection 1 mg, 1 mg, Intravenous, Q1H PRN **OR** LORazepam (ATIVAN) tab 2 mg, 2 mg, Oral, Q1H PRN **OR** LORazepam (ATIVAN) injection 2 mg, 2 mg, Intravenous, Q1H PRN  •  thiamine (Vitamin B-1) tab 100 mg, 100 mg, Oral, Daily  • benzodiazepines per psychiatry  -Cont haldol  -As needed Ativan per detox protocol  -Psychiatry following     Bimalleolar fracture, right ankle, open. Status post open reduction external fixation, debridement on 10/25.   -now s/p RIGHT ANKLE FRACTURE EXTER

## 2021-11-11 NOTE — OCCUPATIONAL THERAPY NOTE
OCCUPATIONAL THERAPY EVALUATION - INPATIENT      Room Number: 408/408-A  Evaluation Date: 11/11/2021  Type of Evaluation: Re-evaluation     Presenting Problem: Open ankle fx with OREF and debridement on 10/25/21, external fixator removal and R ankle ORIF o for stand-pivot t/f from EOB>chair with physical assist to maintain R LE elevated off of floor and max verbal cues for sequencing and safe hand placement. Reported moderate pain in R knee and distal to foot at rest and with mobility.  Patient seated in beds [facility])      Past Medical History  Past Medical History:   Diagnosis Date   • Alcoholic cirrhosis of liver ([facility] [address]    • BPH (benign prostatic hyperplasia)    • [facility]    • Disorder of liver    • Es[facility] varices [facility]     without bleeding   • Former sm extremity strength is within functional limits     ACTIVITIES OF DAILY LIVING ASSESSMENT  AM-PAC ‘6-Clicks’ Inpatient Daily Activity Short Form  How much help from another person does the patient currently need…  -   Putting on and taking off regular [place], [address] [place] [address]  #[phone]

## 2021-11-12 PROCEDURE — 99232 SBSQ HOSP IP/OBS MODERATE 35: CPT | Performed by: OTHER

## 2021-11-12 PROCEDURE — 99233 SBSQ HOSP IP/OBS HIGH 50: CPT | Performed by: HOSPITALIST

## 2021-11-12 NOTE — PHYSICAL THERAPY NOTE
Per RN to hold pt at this time. Pt was up all night and wanted pt to get some sleep. Will follow up later as appropriate. RN aware.

## 2021-11-12 NOTE — PROGRESS NOTES
Patient is a 62year old   male with history of alcohol dependence, alcoholic cirrhosis liver, BPH and history of renal cancer who presented to the hospital post fall and found with female oral fracture of right ankle s/p open reduction. Medical History:       Past Medical History  Past Medical History:   Diagnosis Date   • Alcoholic cirrhosis of liver (HCC)    • BPH (benign prostatic hyperplasia)    • Cancer ([facility] [address]    • Disorder of liver    • Esophageal varices ([facility] [address]     without bleedi PRN  thiamine (Vitamin B-1) tab 100 mg, 100 mg, Oral, Daily  multivitamin with minerals (ADULT) tab 1 tablet, 1 tablet, Oral, Daily  folic acid (FOLVITE) tab 1 mg, 1 mg, Oral, Daily  hydrALAzine HCl (APRESOLINE) injection 10 mg, 10 mg, Intravenous, Q4H PRN 11/11/2021    PLT 83.0 (L) 11/11/2021    CREATSERUM 0.91 11/11/2021    BUN 9 11/11/2021     11/11/2021    K 3.6 11/12/2021     11/11/2021    CO2 21.0 11/11/2021     (H) 11/11/2021    CA 7.5 (L) 11/11/2021    ALB 1.7 (L) 11/11/2021    ALK anxious. Thought process:  Distracted. Thought content:  Patient denies any suicidal or homicidal ideation. Perceptions: The patient has been demonstrating response to internal stimuli.   Concentration: Continue to be somewhat impaired due to his distra Metabolic Panel (14)      Magnesium      Prothrombin Time (PT)      PTT, Activated      Troponin I      Troponin I      Lipid Panel      Troponin I      Magnesium      LDH      Haptoglobin      Reticulocyte Count      Iron And Tibc      Ferritin      Vitam needed for Pain. • multivitamin Oral Tab 30 tablet 0     Sig: Take 1 tablet by mouth daily.            Dickson Hernández MD  11/12/2021

## 2021-11-12 NOTE — CM/SW NOTE
JEAN CARLOS followed up on DC planning. JEAN CARLOS spoke with Mike from [address] [place] [address] who confirms they did have insurance authorization for the pt prior to surgery. Additional clinical updates attached in aidin. Mike will resubmit for ins auth.      Pt still on IV haldol

## 2021-11-12 NOTE — PLAN OF CARE
Patient is alert and oriented x1. Not able to follow commands. Showing no signs or symptoms of any distress. POD #2 of a right ankle removal of external fixation- NWB. Mold and splint in place- unable to assess extremity further. Denies pain.  Ambulating wi non-pharmacological measures as appropriate and evaluate response  - Consider cultural and social influences on pain and pain management  - Manage/alleviate anxiety  - Utilize distraction and/or relaxation techniques  - Monitor for opioid side effects  - N Refer to Case Management Department for coordinating discharge planning if the patient needs post-hospital services based on physician/LIP order or complex needs related to functional status, cognitive ability or social support system  Outcome: Progressing

## 2021-11-12 NOTE — PROGRESS NOTES
[facility]    Progress Note    Nurys Aguilera Patient Status:  Inpatient    1960 MRN [id number]   Location [facility] 4W/SW/SE Attending Tristen Granados MD   Hosp Day # 18 PCP None Pcp       Subjective:   Nurys Aguilera i (ATIVAN) injection 1 mg, 1 mg, Intravenous, Q1H PRN **OR** LORazepam (ATIVAN) tab 2 mg, 2 mg, Oral, Q1H PRN **OR** LORazepam (ATIVAN) injection 2 mg, 2 mg, Intravenous, Q1H PRN  •  thiamine (Vitamin B-1) tab 100 mg, 100 mg, Oral, Daily  •  multivitamin wit psychiatry  -Cont haldol  -As needed PO Ativan per detox protocol  -Psychiatry following     Bimalleolar fracture, right ankle, open. Status post open reduction external fixation, debridement on 10/25.   -now s/p RIGHT ANKLE FRACTURE EXTERNAL FIXATION SEVERIANO

## 2021-11-12 NOTE — PROGRESS NOTES
[facility] HOSP - [facility]    Progress Note    Julieta Durand Patient Status:  Inpatient    1960 MRN [id number]   Location [facility] 4W/SW/SE Attending Jose Daniel Montano MD   Hosp Day # 18 PCP None Pcp        Subjective:   Julieta Durand once he meets medicine criteria.               Results:     Lab Results   Component Value Date    WBC 4.0 11/11/2021    HGB 8.9 (L) 11/11/2021    HCT 25.3 (L) 11/11/2021    PLT 83.0 (L) 11/11/2021    CREATSERUM 0.91 11/11/2021    BUN 9 11/11/2021

## 2021-11-13 PROCEDURE — 99232 SBSQ HOSP IP/OBS MODERATE 35: CPT | Performed by: HOSPITALIST

## 2021-11-13 RX ORDER — CIPROFLOXACIN 2 MG/ML
400 INJECTION, SOLUTION INTRAVENOUS EVERY 12 HOURS
Status: DISCONTINUED | OUTPATIENT
Start: 2021-11-13 | End: 2021-11-16

## 2021-11-13 RX ORDER — HALOPERIDOL 5 MG/ML
1 INJECTION INTRAMUSCULAR 3 TIMES DAILY
Status: DISCONTINUED | OUTPATIENT
Start: 2021-11-13 | End: 2021-11-18

## 2021-11-13 RX ORDER — LORAZEPAM 2 MG/ML
0.5 INJECTION INTRAMUSCULAR EVERY 4 HOURS PRN
Status: DISCONTINUED | OUTPATIENT
Start: 2021-11-13 | End: 2021-11-20

## 2021-11-13 RX ORDER — HALOPERIDOL 5 MG/ML
2 INJECTION INTRAMUSCULAR EVERY 2 HOUR PRN
Status: DISCONTINUED | OUTPATIENT
Start: 2021-11-13 | End: 2021-12-10

## 2021-11-13 NOTE — PROGRESS NOTES
[facility]    Progress Note    Eulalia Hummel Patient Status:  Inpatient    1960 MRN [id number]   Location [facility] 4W/SW/SE Attending Jordyn Martinez MD   Hosp Day # 23 PCP None Pcp       Subjective:   Eulalia Hummel i Intravenous, Q1H PRN  •  thiamine (Vitamin B-1) tab 100 mg, 100 mg, Oral, Daily  •  multivitamin with minerals (ADULT) tab 1 tablet, 1 tablet, Oral, Daily  •  folic acid (FOLVITE) tab 1 mg, 1 mg, Oral, Daily  •  hydrALAzine HCl (APRESOLINE) injection 10 mg open.  Status post open reduction external fixation, debridement on 10/25. -now s/p RIGHT ANKLE FRACTURE EXTERNAL FIXATION REMOVAL AND OPEN REDUCTION INTERNAL FIXATION  -Orthopedics following  -Pain controlled  -cont dvt ppx     Coagulopathy.   Due to live

## 2021-11-13 NOTE — PLAN OF CARE
Pt is very confused and impulsive, pt needs a sitter or a posey vest, pt on camera monitor constantly climbing oob all night long, this is not a safe situation as either the nurse or the PCT has to constantly monitor the pt for safety, pt on room air    Pr facility with appropriate resources  Description: INTERVENTIONS:  - Identify barriers to discharge w/pt and caregiver  - Include patient/family/discharge partner in discharge planning  - Arrange for needed discharge resources and transportation as appropri

## 2021-11-13 NOTE — PLAN OF CARE
Patient is alert and oriented x1. Impulsive. Unable to follow commands. Showing no signs or symptoms of any distress. POD#3 of a right ankle removal of external fixation- NWB. Mold and splint in place- unable to assess extremity further. Denies pain.  After assistance  - Assess pain using appropriate pain scale  - Administer analgesics based on type and severity of pain and evaluate response  - Implement non-pharmacological measures as appropriate and evaluate response  - Consider cultural and social influenc patient/family/discharge partner  - Complete POLST form as appropriate  - Assess patient's ability to be responsible for managing their own health  - Refer to Case Management Department for coordinating discharge planning if the patient needs post-hospital

## 2021-11-13 NOTE — PHYSICAL THERAPY NOTE
PHYSICAL THERAPY TREATMENT NOTE - INPATIENT     Room Number: 057/729-F       Presenting Problem: Open ankle fx with I and D with External fixator on 10/25/21 .   Hospital course complicated by  Delirium / coagulopathy related to ETOH [facility]    SUBJECTIVE  [place] am at the [employer].     OBJECTIVE  Precautions: Limb alert - right;Bed/chair alarm    WEIGHT BEARING RESTRICTION        R Lower Extremity: Non-Weight Bearing       PAIN ASSESSMENT   Rating: Unable to rate  Location: moderate pain at 2 weeks   Patient Goal Patient's self-stated goal is:none stated   Family goal for rehab    Goal #1 Patient is able to demonstrate supine - sit EOB @ level: supervision     Goal #1   Current Status Pt in sitting position   Goal #2 Patient is able to demons

## 2021-11-14 PROCEDURE — 99233 SBSQ HOSP IP/OBS HIGH 50: CPT | Performed by: HOSPITALIST

## 2021-11-14 NOTE — PROGRESS NOTES
[facility]    Progress Note    Kev Clifton Patient Status:  Inpatient    1960 MRN [id number]   Location [facility] 4W/SW/SE Attending Cristal Roman MD   Hosp Day # 20 PCP None Pcp        Subjective:   Kev Clifton Patient will need follow-up at 10 to 14 days postop in my clinic for splint removal and suture removal as well as a wound check. Continue physical therapy, nonweightbearing right lower extremity.                 Results:     Lab Results   Component Value D

## 2021-11-14 NOTE — PLAN OF CARE
Pt oriented x1. Pt extremely impulsive and does not comply with safety measures or listen to instruction from staff about staying in bed.  Bed alarm and video monitoring in place but at beginning of shift pt continuously attempting to get out of bed, climbi interventions  Outcome: Progressing  Goal: Patient/Family Short Term Goal  Description: Patient's Short Term Goal: to be off of restraints    Interventions:   - psychiatry consult   - See additional Care Plan goals for specific interventions  Outcome: Prog home or other facility with appropriate resources  Description: INTERVENTIONS:  - Identify barriers to discharge w/pt and caregiver  - Include patient/family/discharge partner in discharge planning  - Arrange for needed discharge resources and transportati Progressing

## 2021-11-14 NOTE — PROGRESS NOTES
[facility] HOSP - [facility]    Progress Note    Danis Candelaria Patient Status:  Inpatient    1960 MRN [id number]   Location [facility] 4W/SW/SE Attending Connie Hewitt MD   Hosp Day # 20 PCP None Pcp       Subjective:   Danis Candelaria i dextrose 5 % and 0.9 % NaCl infusion, , Intravenous, Continuous  •  LORazepam (ATIVAN) injection 1 mg, 1 mg, Intravenous, Once  •  LORazepam (ATIVAN) tab 1 mg, 1 mg, Oral, Q1H PRN **OR** LORazepam (ATIVAN) injection 1 mg, 1 mg, Intravenous, Q1H PRN **OR** Patient has had a fluctuating course but overall is improved.    In the setting of etoh abuse  Continues to sun-down at night  -Continue scheduled  benzodiazepines per psychiatry  -Cont haldol  -As needed PO Ativan per detox protocol  -Psychiatry following

## 2021-11-14 NOTE — PLAN OF CARE
Aaron Franklin is from home with his wife. Alert to self only. Pod 4 right ankle removal ex fix and orif. Assessment limited d/t patient's mentation. [place] vest in place-video monitoring-haldol atc-ativan prn-\"busy vest\" provided-frequent rounding.  Safety is intac assistance  - Assess pain using appropriate pain scale  - Administer analgesics based on type and severity of pain and evaluate response  - Implement non-pharmacological measures as appropriate and evaluate response  - Consider cultural and social influenc patient/family/discharge partner  - Complete POLST form as appropriate  - Assess patient's ability to be responsible for managing their own health  - Refer to Case Management Department for coordinating discharge planning if the patient needs post-hospital

## 2021-11-15 ENCOUNTER — APPOINTMENT (OUTPATIENT)
Dept: CT IMAGING | Facility: HOSPITAL | Age: 61
DRG: 492 | End: 2021-11-15
Attending: HOSPITALIST
Payer: OTHER MISCELLANEOUS

## 2021-11-15 PROCEDURE — 70450 CT HEAD/BRAIN W/O DYE: CPT | Performed by: HOSPITALIST

## 2021-11-15 PROCEDURE — 99232 SBSQ HOSP IP/OBS MODERATE 35: CPT | Performed by: HOSPITALIST

## 2021-11-15 NOTE — DIETARY NOTE
11/5/21 Dietitian Note:     Met with patient this am. Patient was alert during visit. 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  No issues re: eating per discussion this am. CAITLYN austin past medical history of Alcoholic cirrhosis of liver ([facility] [address] BPH (benign prostatic hyperplasia), Cancer ([facility] [address] Disorder of liver, Esophageal varices ([facility] [address] Former smoker, Heavy drinker of alcohol, Hepatic encephalopathy ([facility] [address] Portal hypertension ([facility] [address] and Oral BID     LABS: reviewed  Recent Labs     11/12/21  0650 11/14/21  0741 11/15/21  0648   GLU  --  101*  --    BUN  --  5*  --    CREATSERUM  --  0.62*  --    CA  --  8.3*  --    MG 1.7 1.5* 1.9   NA  --  139  --    K 3.6 3.6  --    CL  --  108  --    CO Supplements (ONS)-RD added Ensure Compact (220 calories/ 9 g protein each) Chocolate TID. Rational/use of oral supplements discussed.   - Vitamin and mineral supplements: none-PTA  - Feeding assistance: meal set up and feed  - Nutrition education: not appro

## 2021-11-15 NOTE — PLAN OF CARE
Patient is alert to self. Hospitalist updated, CT ordered for patients confusion. Denies pain. Incontinent of urine, Primofit in place. Thiamine IV given. Schedule haldol. Tele, no calls. Cozy vest restraint, circulation and skin checked.  Fluids and food o influences on pain and pain management  - Manage/alleviate anxiety  - Utilize distraction and/or relaxation techniques  - Monitor for opioid side effects  - Notify MD/LIP if interventions unsuccessful or patient reports new pain  - Anticipate increased mario post-hospital services based on physician/LIP order or complex needs related to functional status, cognitive ability or social support system  Outcome: Progressing     Problem: Delirium  Goal: Minimize duration of delirium  Description: Interventions:  - E

## 2021-11-15 NOTE — PLAN OF CARE
Problem: Patient Centered Care  Goal: Patient preferences are identified and integrated in the patient's plan of care  Description: Interventions:  - What would you like us to know as we care for you?  \"I want to know where my wife is\"  - Provide timely limitations  - Instruct pt to call for assistance with activity based on assessment  - Modify environment to reduce risk of injury  - Provide assistive devices as appropriate  - Consider OT/PT consult to assist with strengthening/mobility  - Encourage toil maintained on posey vest restraints still noted with confusion, alert to self only. He's always trying to get out of bed with close monitoring done. PRN medications given with minimal relief.  Pt kept moving around in bed, taking off blankets and gown, requ

## 2021-11-15 NOTE — PROGRESS NOTES
[facility]    Progress Note    Adalberto Portal Patient Status:  Inpatient    1960 MRN [id number]   Location [facility] 4W/SW/SE Attending Anayeli Anaya MD   Hosp Day # 21 PCP None Pcp       Subjective:   Adalberto Portal i Q2H PRN  •  dextrose 5 % and 0.9 % NaCl infusion, , Intravenous, Continuous  •  LORazepam (ATIVAN) injection 1 mg, 1 mg, Intravenous, Once  •  LORazepam (ATIVAN) tab 1 mg, 1 mg, Oral, Q1H PRN **OR** LORazepam (ATIVAN) injection 1 mg, 1 mg, Intravenous, Q1H Severe. Patient has had a fluctuating course but overall is improved.    In the setting of etoh abuse  Continues to sun-down at night  -Continue scheduled  benzodiazepines per psychiatry  -Cont haldol  -As needed PO Ativan per detox protocol  -Psychiatry f

## 2021-11-15 NOTE — PAYOR COMM NOTE
--------------  CONTINUED STAY REVIEW    Payor: [employer]  Subscriber #:  [id number]  Authorization Number: [id number]    Admit date: 10/25/21  Admit time:  8:23 PM    Admitting Physician: Stormy Baer MD  Attending Physician:  Jordyn Martinez MD User    11/14/2021 1400 New Bag 4 g Intravenous Beatriz Dangelo RN      metoprolol tartrate (LOPRESSOR) tab 25 mg     Date Action Dose Route User    11/15/2021 0542 Given 25 mg Oral Rachael Welsh RN    11/14/2021 1800 Given 25 mg Oral EDNA Eaton 11/10/21 0900 4 FRANCIA    11/10/21 0700 8 KT    11/10/21 0430 9 KT    11/10/21 0002 7 KT    11/09/21 2200 9 KT    11/09/21 2008 15 KT    11/09/21 1959 15 KT    11/09/21 1800 4 Y    11/09/21 1544 4 Y    11/09/21 1400 4 Y    11/09/21 1000 4 Y    11/09/21 080 8 KMA    10/31/21 0800 9 SM    10/30/21 2000 10 KMA    10/30/21 0800 6 ZS    10/30/21 0600 10 AW    10/30/21 0400 9 AW    10/30/21 0200 9 AW    10/30/21 0000 12 AW    10/29/21 2200 15 AW    10/29/21 2000 19 AW    10/29/21 1600 21 PZ    10/29/21 1459 15 MW assess       Assessment and Plan:   Delirium tremens. Leti Burton has had a fluctuating course but overall is improved.    In the setting of etoh abuse  Continues to sun-down at night  -Continue scheduled  benzodiazepines per psychiatry  -Cont haldol pulse 81, temperature 98.1 °F (36.7 °C), temperature source Oral, resp. rate 20, height 5' 10\" (1.778 m), weight 182 lb 4.8 oz (82.7 kg), SpO2 100 %. Physical Exam:   General: Alert, orientated. Cooperative. No apparent distress.   INCISION/WOUND: cl

## 2021-11-15 NOTE — PHYSICAL THERAPY NOTE
Attempted PT treatment- RN at bedside, administering meds. Requesting therapy return later. Will re-attempt as schedule permits. 2nd attempt to see pt- assist from rehab Open[employer].  Patient not following commands (in Posey vest), pushing therapist ou

## 2021-11-16 ENCOUNTER — APPOINTMENT (OUTPATIENT)
Dept: CT IMAGING | Facility: HOSPITAL | Age: 61
DRG: 492 | End: 2021-11-16
Attending: Other
Payer: OTHER MISCELLANEOUS

## 2021-11-16 PROCEDURE — 99233 SBSQ HOSP IP/OBS HIGH 50: CPT | Performed by: OTHER

## 2021-11-16 PROCEDURE — 74150 CT ABDOMEN W/O CONTRAST: CPT | Performed by: OTHER

## 2021-11-16 PROCEDURE — 99233 SBSQ HOSP IP/OBS HIGH 50: CPT | Performed by: HOSPITALIST

## 2021-11-16 RX ORDER — CIPROFLOXACIN 500 MG/1
500 TABLET, FILM COATED ORAL EVERY 12 HOURS SCHEDULED
Status: DISCONTINUED | OUTPATIENT
Start: 2021-11-16 | End: 2021-11-21

## 2021-11-16 RX ORDER — OLANZAPINE 2.5 MG/1
2.5 TABLET ORAL NIGHTLY
Status: DISCONTINUED | OUTPATIENT
Start: 2021-11-16 | End: 2021-11-24

## 2021-11-16 RX ORDER — DIVALPROEX SODIUM 250 MG/1
250 TABLET, DELAYED RELEASE ORAL 2 TIMES DAILY
Status: DISCONTINUED | OUTPATIENT
Start: 2021-11-16 | End: 2021-12-10

## 2021-11-16 NOTE — PLAN OF CARE
VSS. Pt is alert to self, Posey vest restraints maintained, q2h assessments. Pt became frustrated and aggressive toward staff this AM. Ativan IV q4h prn for agitation. Incontinent, changed as needed.  Bathed this AM after urinating a considerable amount on ADULT  Goal: Verbalizes/displays adequate comfort level or patient's stated pain goal  Description: INTERVENTIONS:  - Encourage pt to monitor pain and request assistance  - Assess pain using appropriate pain scale  - Administer analgesics based on type and Identify discharge learning needs (meds, wound care, etc)  - Arrange for interpreters to assist at discharge as needed  - Consider post-discharge preferences of patient/family/discharge partner  - Complete POLST form as appropriate  - Assess patient's abil

## 2021-11-16 NOTE — PROGRESS NOTES
[facility] Pharmacy Note: Route Optimization for Ciprofloxacin (CIPRO)    Patient is currently on Ciprofloxacin (CIPRO) 400 mg IV every 12 hours.    The patient meets the criteria to convert to the oral equivalent as established by the IV to Oral conversion protoc

## 2021-11-16 NOTE — PHYSICAL THERAPY NOTE
Attempted follow up PT treatment- pt is soundly sleeping- unable to arouse. Due to limited alertness- unsafe to attempt PT intervention at this time. Will reschedule.      Thank you,  Billie Hogue, PT, DPT

## 2021-11-16 NOTE — OCCUPATIONAL THERAPY NOTE
Attempted to see pt for f/u occupational therapy tx session, however provided verbal/tactile stimulation, pt unable to open eyes, sleeping, in posey vest. Will re-attempt at later date.      Taniya JEFFREY/L  [facility]   #[id number]

## 2021-11-16 NOTE — PLAN OF CARE
Patient is alert to self. Unaware of locations pees in bed and on floor. Restraints in place, Cozy vest. Patient offered fluids and food. Circulations and skin checked. Tele, no calls. Hospitalist updated, labs added. Scheduled haldol given. ABX for UTI.  [place] cultural and social influences on pain and pain management  - Manage/alleviate anxiety  - Utilize distraction and/or relaxation techniques  - Monitor for opioid side effects  - Notify MD/LIP if interventions unsuccessful or patient reports new pain  - Anti patient needs post-hospital services based on physician/LIP order or complex needs related to functional status, cognitive ability or social support system  Outcome: Progressing     Problem: Delirium  Goal: Minimize duration of delirium  Description: Inter

## 2021-11-16 NOTE — PROGRESS NOTES
[facility]    Progress Note    Kev Clifton Patient Status:  Inpatient    1960 MRN [id number]   Location [facility] 4W/SW/SE Attending Cristal Roman MD   Hosp Day # 22 PCP None Pcp       Subjective:   Kev Clifton i PRN  •  dextrose 5 % and 0.9 % NaCl infusion, , Intravenous, Continuous  •  LORazepam (ATIVAN) injection 1 mg, 1 mg, Intravenous, Once  •  LORazepam (ATIVAN) tab 1 mg, 1 mg, Oral, Q1H PRN **OR** LORazepam (ATIVAN) injection 1 mg, 1 mg, Intravenous, Q1H PRN Severe. Patient has had a fluctuating course but overall is improved.    In the setting of etoh abuse  Continues to sun-down at night  Hepatic encephalopathy likely contributing, also consider korsakoff's psychosis  -Continue scheduled  benzodiazepines per MD  11/16/2021

## 2021-11-16 NOTE — PROGRESS NOTES
Patient is a 62year old   male with history of alcohol dependence, alcoholic cirrhosis liver, BPH and history of renal cancer who presented to the hospital post fall and found with female oral fracture of right ankle s/p open reduction. None    Social history:   The patient is  and lives with his wife. Patient is a former smoker supposedly he quit in 2009. Patient is heavy in alcohol abuse. Amount not been specified.       Medical History:       Past Medical History  Past Medical 2x Daily(Beta Blocker)  lactulose (CHRONULAC) 10 GM/15ML solution 20 g, 20 g, Oral, TID  haloperidol lactate (HALDOL) 5 MG/ML injection 2 mg, 2 mg, Intravenous, Q2H PRN  dextrose 5 % and 0.9 % NaCl infusion, , Intravenous, Continuous  LORazepam (ATIVAN) in daily. nebivolol 10 MG Oral Tab, Take 10 mg by mouth daily. tamsulosin (FLOMAX) cap, Take by mouth daily. tiZANidine 4 MG Oral Tab, Take 4 mg by mouth nightly as needed. zolpidem 10 MG Oral Tab, Take 10 mg by mouth nightly as needed for Sleep.         A examined  Attitude/Cooperation:  The patient has been demonstrating fluctuation in mood and cognition.   Behavior:  Patient was cooperative during evaluation  Speech:  Speaking English, low tone, slightly slurred speech  Mood:  \"OK\"  Affect:  Restricted a delirium.         Orders This Visit:  Orders Placed This Encounter      CBC With Differential With Platelet      Basic Metabolic Panel (8)      Urinalysis with Culture Reflex      Scan slide      Basic Metabolic Panel (8)      CBC With Differential With Amor Pathology Tissue      Rapid SARS-CoV-2 by PCR      Rapid SARS-CoV-2 by PCR      Blood Culture      Urine Culture, Routine      Meds This Visit:  Requested Prescriptions     Signed Prescriptions Disp Refills   • aspirin 325 MG Oral Tab 60 tablet 0     Sig:

## 2021-11-17 PROCEDURE — 99233 SBSQ HOSP IP/OBS HIGH 50: CPT | Performed by: HOSPITALIST

## 2021-11-17 RX ORDER — LACTULOSE 10 G/15ML
30 SOLUTION ORAL 3 TIMES DAILY
Status: DISCONTINUED | OUTPATIENT
Start: 2021-11-17 | End: 2021-11-18

## 2021-11-17 NOTE — PLAN OF CARE
Pt is being transferred to 5 medical floor. Alert and oriented to only self. Increased agitation in the am. Gave scheduled Haldol. Room air. Tele. General diet; pt has poor appetite, refusing meals. Pt refused some of the am meds; MD aware.  Incontinent; vo based on type and severity of pain and evaluate response  - Implement non-pharmacological measures as appropriate and evaluate response  - Consider cultural and social influences on pain and pain management  - Manage/alleviate anxiety  - Utilize distractio patient's ability to be responsible for managing their own health  - Refer to Case Management Department for coordinating discharge planning if the patient needs post-hospital services based on physician/LIP order or complex needs related to functional sta

## 2021-11-17 NOTE — OCCUPATIONAL THERAPY NOTE
OCCUPATIONAL THERAPY TREATMENT NOTE - INPATIENT    Room Number: 408/408-A     Principal Problem:   Open ankle fx with OREF and debridement on 10/25/21, external fixator removal and R ankle ORIF on 11/10/21, delirium r/t ETOH  abuse  Active Problems:    Typ benefit from ALEXANDRA. DISCHARGE RECOMMENDATIONS  OT Discharge Recommendations: Sub-acute rehabilitation  OT Device Recommendations: TBD    PLAN  OT Treatment Plan: Balance activities; Energy conservation/work simplification techniques;ADL training;Functional transfers, R LE NWB status, bed mobility  Patient End of Session: In bed;Needs met;Call light within reach;RN aware of session/findings; All patient questions and concerns addressed; Alarm set; Family present    OT Goals:    Patient self-stated goal is: none

## 2021-11-17 NOTE — CM/SW NOTE
JEAN CARLOS followed up on DC planning. JEAN CARLOS received call from Mike at the [facility] who states the [facility] insurance is asking for PT notes. However therapy has not been able to work with pt. Additionally pt still on restraints.      Pt has not been able to participate

## 2021-11-17 NOTE — PROGRESS NOTES
[facility]    Progress Note    Corrinne Beath Patient Status:  Inpatient    1960 MRN [id number]   Location [facility] 4W/SW/SE Attending Ina Novoa, [address] [facility] Day # 23 PCP None Pcp       Subjective:   Corrinne Beath is (ATIVAN) injection 2 mg, 2 mg, Intravenous, Q1H PRN  multivitamin with minerals (ADULT) tab 1 tablet, 1 tablet, Oral, Daily  folic acid (FOLVITE) tab 1 mg, 1 mg, Oral, Daily  hydrALAzine HCl (APRESOLINE) injection 10 mg, 10 mg, Intravenous, Q4H PRN  acetam (H) 10/28/2021       CT ABDOMEN (CPT=74150)    Result Date: 11/16/2021  CONCLUSION:  Gallbladder distention with cholelithiasis. No definite gallbladder wall thickening on this unenhanced examination. Consider follow-up ultrasound of the gallbladder.    H consult - depakote added   -Cont haldol  -As needed PO Ativan per detox protocol  -restraints, posey PRN  -CT head WNL    Elevated LFT's - mild bump today from baseline. CT a/p again reviewed and neg for obstruction. Likely due to cirrhosis.  Repeat in am .

## 2021-11-17 NOTE — PLAN OF CARE
Patient received in bed from ortho unit as transfer. Alert x 1 and confused. Vital signs taken and stable. Vest restraint in place and was monitored for patient safety. Patient has adequate circulation, sensation, and movement.  Dressing to right lower extr

## 2021-11-17 NOTE — PHYSICAL THERAPY NOTE
PHYSICAL THERAPY TREATMENT NOTE - INPATIENT     Room Number: 496/342-R       Presenting Problem: Open ankle fx with I and D with External fixator on 10/25/21 .   Hospital course complicated by  Delirium / coagulopathy related to [facility] Cues to keep RLE off floor to maintain compliance toward [address] RLE. Tolerated 1.5 minutes of static standing with bilateral UE support on RW requiring Mod for balance. Deferred ambulation due to unsteadiness in standing.      Patient supine in bed at end from Another: Need to walk in hospital room?: Total   Help from Another: Climbing 3-5 steps with a railing?: Total     AM-PAC Score:  Raw Score: 11   Approx Degree of Impairment: 72.57%   Standardized Score (AM-PAC Scale): 33.86   CMS Modifier (G-Code): CL

## 2021-11-17 NOTE — PLAN OF CARE
Patient alert only to self. Talking about other people in the room that were not there at times. He was very restless and did not appear to sleep at all throughout the night shift. Frequently attempting to exit the bed.   Cozy vest in place, order  request assistance  - Assess pain using appropriate pain scale  - Administer analgesics based on type and severity of pain and evaluate response  - Implement non-pharmacological measures as appropriate and evaluate response  - Consider cultural and social of patient/family/discharge partner  - Complete POLST form as appropriate  - Assess patient's ability to be responsible for managing their own health  - Refer to Case Management Department for coordinating discharge planning if the patient needs post-hospi

## 2021-11-18 ENCOUNTER — APPOINTMENT (OUTPATIENT)
Dept: GENERAL RADIOLOGY | Facility: HOSPITAL | Age: 61
DRG: 492 | End: 2021-11-18
Attending: INTERNAL MEDICINE
Payer: OTHER MISCELLANEOUS

## 2021-11-18 PROCEDURE — 99233 SBSQ HOSP IP/OBS HIGH 50: CPT | Performed by: HOSPITALIST

## 2021-11-18 PROCEDURE — 74018 RADEX ABDOMEN 1 VIEW: CPT | Performed by: INTERNAL MEDICINE

## 2021-11-18 PROCEDURE — 99254 IP/OBS CNSLTJ NEW/EST MOD 60: CPT | Performed by: INTERNAL MEDICINE

## 2021-11-18 RX ORDER — HALOPERIDOL 5 MG/ML
1 INJECTION INTRAMUSCULAR 2 TIMES DAILY
Status: DISCONTINUED | OUTPATIENT
Start: 2021-11-19 | End: 2021-11-24

## 2021-11-18 RX ORDER — LACTULOSE 10 G/15ML
30 SOLUTION ORAL EVERY 6 HOURS
Status: DISCONTINUED | OUTPATIENT
Start: 2021-11-18 | End: 2021-11-19

## 2021-11-18 RX ORDER — MELATONIN
100 DAILY
Status: DISCONTINUED | OUTPATIENT
Start: 2021-11-19 | End: 2021-12-10

## 2021-11-18 NOTE — PROGRESS NOTES
[place] [facility] - [facility]    Progress Note    Aryan Jameson Patient Status:  Inpatient    1960 MRN [id number]   Location [facility] 4W/SW/SE Attending Yuniel Love, [address] [facility] Day # 24 PCP None Pcp       Subjective:   Aryan Jameson is Or  LORazepam (ATIVAN) injection 1 mg, 1 mg, Intravenous, Q1H PRN   Or  LORazepam (ATIVAN) tab 2 mg, 2 mg, Oral, Q1H PRN   Or  LORazepam (ATIVAN) injection 2 mg, 2 mg, Intravenous, Q1H PRN  multivitamin with minerals (ADULT) tab 1 tablet, 1 tablet, Oral, D 11/18/2021    PTT 40.4 (H) 10/28/2021    INR 1.95 (H) 11/08/2021    MG 1.9 11/15/2021    PHOS 3.1 11/11/2021    TROP 0.061 (HH) 10/28/2021    B12 1,551 (H) 10/28/2021       No results found.         Results:     CBC:    Lab Results   Component Value Date    Other problems  Elevated troponin of unclear significance  Cirrhosis  Thrombocytopenia  Alcohol abuse  Jaundice     dvt prophylaxis: sc heparin  code status: full code  Dispo: TBD                     Paulie Tucker DO  >35 min spent with patient.  >

## 2021-11-18 NOTE — PLAN OF CARE
Problem: Patient Centered Care  Goal: Patient preferences are identified and integrated in the patient's plan of care  Description: Interventions:  - What would you like us to know as we care for you?  \"I want to know where my wife is\"  - Provide timely fever/infection during anticipated neutropenic period  Description: INTERVENTIONS  - Monitor WBC  - Administer growth factors as ordered  - Implement neutropenic guidelines  Outcome: Progressing     Problem: SAFETY ADULT - FALL  Goal: Free from fall injury restraints applied  - Assess for any contributing factors to confusion (electrolyte disturbances, delirium, medications)  - Discontinue any unnecessary medical devices as soon as possible  - Assess the patient's physical comfort, circulation, skin conditio

## 2021-11-18 NOTE — CM/SW NOTE
CM received call from [facility] , Pt has been clinically accepted. CM also confirmed that [facility] can accept pt in restraints and on IV Hadol. CM spoke to pts wife,   Noel Ross, informed her of above.   Pts wife requesting to tour facility

## 2021-11-18 NOTE — CONSULTS
[facility]   Gastroenterology Consultation Note    Munira De  Patient Status:    Inpatient  Date of Admission:         10/25/2021, Hospital day #24  Attending:   Lor Cannon DO  PCP:     None Pcp    Reason for Consultation:  Hep never used smokeless tobacco. He reports current alcohol use. He reports previous drug use.     Allergies:  No Known Allergies    Medications:    Current Facility-Administered Medications:   •  lactulose (CHRONULAC) 10 GM/15ML 200 g in [facility] [employer] for Irr 1 mg, Intravenous, Q2H PRN **OR** [DISCONTINUED] morphINE sulfate (PF) 2 MG/ML injection 2 mg, 2 mg, Intravenous, Q2H PRN **OR** [DISCONTINUED] morphINE sulfate (PF) 4 MG/ML injection 4 mg, 4 mg, Intravenous, Q2H PRN  •  polyethylene glycol (PEG 3350) (VALENCIA HPI  GENITOURINARY:  negative for dysuria or gross hematuria  INTEGUMENT/BREAST:  SKIN:  negative for jaundice   ALLERGIC/IMMUNOLOGIC:  negative for hay fever  ENDOCRINE:  negative for cold intolerance and heat intolerance  MUSCULOSKELETAL:  negative for j structures. Small left lumbar fat containing hernia. Lesser incidental findings as above.       Dictated by (CST): Chase Cullen MD on 11/16/2021 at 1:13 PM     Finalized by (CST): Chase Cullen MD on 11/16/2021 at 1:26 PM            Assessment &

## 2021-11-18 NOTE — PLAN OF CARE
Problem: Patient Centered Care  Goal: Patient preferences are identified and integrated in the patient's plan of care  Description: Interventions:  - What would you like us to know as we care for you?  \"I want to know where my wife is\"  - Provide timely limitations  - Instruct pt to call for assistance with activity based on assessment  - Modify environment to reduce risk of injury  - Provide assistive devices as appropriate  - Consider OT/PT consult to assist with strengthening/mobility  - Encourage toil

## 2021-11-19 PROCEDURE — 99233 SBSQ HOSP IP/OBS HIGH 50: CPT | Performed by: INTERNAL MEDICINE

## 2021-11-19 PROCEDURE — 99233 SBSQ HOSP IP/OBS HIGH 50: CPT | Performed by: HOSPITALIST

## 2021-11-19 RX ORDER — LACTULOSE 10 G/15ML
30 SOLUTION ORAL 3 TIMES DAILY
Status: DISCONTINUED | OUTPATIENT
Start: 2021-11-19 | End: 2021-11-21

## 2021-11-19 NOTE — PHYSICAL THERAPY NOTE
PHYSICAL THERAPY TREATMENT NOTE - INPATIENT     Room Number: 640/922-J       Presenting Problem: Open ankle fx with I and D with External fixator on 10/25/21 .   Hospital course complicated by  Delirium / coagulopathy related to [facility]     The patient's Approx Degree of Impairment: 61.29% has been calculated based on documentation in the [facility] '6 clicks' Inpatient Basic Mobility Short Form.   Research supports that patients with this level of impairment may benefit from sub-acute re (NOT tested )        Patient End of Session: In bed; With [address] [facility] staff;Needs met;Call light within reach;RN aware of session/findings; All patient questions and concerns addressed; Alarm set    CURRENT GOALS     Goals to be met by: 11/25/21   Patient Goal Patient

## 2021-11-19 NOTE — PROGRESS NOTES
[facility]    Progress Note    Eulalia Hummel Patient Status:  Inpatient    1960 MRN [id number]   Location [facility] 4W/SW/SE Attending Warren Torres, [address] [facility] Day # 25 PCP None Pcp       Subjective:   Eulalia Hummel is injection 1 mg, 1 mg, Intravenous, Q1H PRN   Or  LORazepam (ATIVAN) tab 2 mg, 2 mg, Oral, Q1H PRN   Or  LORazepam (ATIVAN) injection 2 mg, 2 mg, Intravenous, Q1H PRN  multivitamin with minerals (ADULT) tab 1 tablet, 1 tablet, Oral, Daily  folic acid (Lucila Agent PHOS 3.1 11/11/2021    TROP 0.061 (HH) 10/28/2021    B12 1,551 (H) 10/28/2021       XR ABDOMEN (1 VIEW) (CPT=74018)    Result Date: 11/18/2021  CONCLUSION:   Focal dilated loop of small bowel within the right lower quadrant has the appearance of is smal obstruction. Likely due to cirrhosis. Repeat in am . Improved      Bimalleolar fracture, right ankle, open.  Status post open reduction external fixation, debridement on 10/25.   -now s/p RIGHT ANKLE FRACTURE EXTERNAL FIXATION REMOVAL AND OPEN REDUCTION INT

## 2021-11-19 NOTE — PAYOR COMM NOTE
--------------  CONTINUED STAY REVIEW    Payor: WORK[employer]  Subscriber #:  [id number]  Authorization Number: [id number]    Admit date: 10/25/21  Admit time:  8:23 PM    Admitting Physician: Dexter Martin MD  Attending Physician:  DO STEPHANI Guerra 11/19/2021 0655 Given 200 g Rectal Nazario Braswell RN    11/18/2021 1746 Given 200 g Rectal Gisela Diehl RN      LORazepam (ATIVAN) injection 0.5 mg     Date Action Dose Route User    11/19/2021 1103 Given 0.5 mg Intravenous Princess Blaise RN    11/19/202 CL    11/11/21 1824 4 CL    11/11/21 1613 3 CL    11/11/21 1400 3 CL    11/11/21 1122 3 CL    11/11/21 0845 4 CL    11/11/21 0628 4 TT    11/11/21 0201 6 TT    11/10/21 2352 4 TT    11/10/21 2120 4 TT    11/10/21 1800 4 FRANCIA    11/10/21 1040 3 FRANCIA    11/10/21 16 SA    11/02/21 0200 1 RB    11/02/21 0000 6 RB    11/01/21 2100 3 SA    11/01/21 2000 0 RB    11/01/21 1600 13 KM    11/01/21 1400 13 KM    11/01/21 1200 13 KM    11/01/21 1000 13 KM    11/01/21 0800 13 KM    11/01/21 0100 13 KMA    10/31/21 2000 8 KMA edema              Lab Results   Component Value Date     WBC 5.9 11/19/2021     HGB 10.8 (L) 11/19/2021     HCT 32.0 (L) 11/19/2021     .0 11/19/2021     CREATSERUM 1.02 11/19/2021     BUN 13 11/19/2021      11/19/2021     K 4.0 11/19/2021 troponin of unclear significance  Cirrhosis  Thrombocytopenia  Alcohol abuse  Jaundice     dvt prophylaxis: lovenox  code status: full code  Dispo: TBD       11/18 GI CONSULT NOTE  Reason for Consultation:  Hepatic encephalopathy     History of Present Ill 8.4 11/18/2021     ALB 1.9 11/18/2021     ALKPHO 111 11/18/2021     BILT 5.3 11/18/2021     TP 6.2 11/18/2021     AST 84 11/18/2021     ALT 49 11/18/2021         Imaging:  CT ABDOMEN (CPT=74150)     Result Date: 11/16/2021  CONCLUSION:  Gallbladder distent discussed with nursing to document BMs  - obtain KUB  - minimize sedating agents  - treatment of UTI per primary

## 2021-11-19 NOTE — DIETARY NOTE
Brief Nutrition Update Note    Update 11/19/21:  Pt seen for follow up. Pt with very poor intake. Per MD notes, pt sedated, confused. Pt receiving lactulose.  Discussed recommendation of temp TF with MD who declined at this time saying pt would not tolerate

## 2021-11-19 NOTE — PROGRESS NOTES
[facility]     Gastroenterology Progress Note    Inez Kanner Patient Status:  Inpatient    1960 MRN [id number]   Location [facility] 5SW/SE Attending Lj Rose, [address] [facility] Day # 25 PCP None Pcp       Subjective terms of mental status in the past 24 hours, after aggressive lactulose administration. Recommend:  - Reduce lactulose to 30 g 3 times daily, again goal of 2-3 bowel movements per day.    - continue xifaxan 550mg po BID  - discussed with nursing to docu

## 2021-11-20 PROCEDURE — 99233 SBSQ HOSP IP/OBS HIGH 50: CPT | Performed by: HOSPITALIST

## 2021-11-20 PROCEDURE — 99232 SBSQ HOSP IP/OBS MODERATE 35: CPT | Performed by: INTERNAL MEDICINE

## 2021-11-20 NOTE — PROGRESS NOTES
[facility]    Progress Note    Royal Washington Patient Status:  Inpatient    1960 MRN [id number]   Location [facility] 4W/SW/SE Attending Mellissa Ashraf, [address] [facility] Day # 26 PCP None Pcp       Subjective:   Royal Washington is (FLOMAX) cap 0.4 mg, 0.4 mg, Oral, Daily  morphINE sulfate (PF) 2 MG/ML injection 1 mg, 1 mg, Intravenous, Q2H PRN  polyethylene glycol (PEG 3350) (MIRALAX) powder packet 17 g, 17 g, Oral, Daily PRN  magnesium hydroxide (MILK OF MAGNESIA) 400 MG/5ML suspen Component Value Date    HGB 10.8 (L) 11/19/2021    HGB 9.6 (L) 11/18/2021    HGB 10.5 (L) 11/17/2021      Lab Results   Component Value Date    .0 11/19/2021    .0 (L) 11/18/2021    .0 11/17/2021       Recent Labs   Lab 11/18/21  104 lovenox  code status: full code  Dispo: TBD - to [facility] soon                     Theador Churn, DO  >35 min spent with patient. > 50% time was spent counseling patient, discussing plan of care, discussing labs and imaging findings.  Spoke with consultant

## 2021-11-20 NOTE — PLAN OF CARE
Problem: PAIN - ADULT  Goal: Verbalizes/displays adequate comfort level or patient's stated pain goal  Description: INTERVENTIONS:  - Encourage pt to monitor pain and request assistance  - Assess pain using appropriate pain scale  - Administer analgesics self-harm  Description: INTERVENTIONS:  - Apply the least restrictive restraint to prevent harm  - Notify patient and family of reasons restraints applied  - Assess for any contributing factors to confusion (electrolyte disturbances, delirium, medications)

## 2021-11-20 NOTE — PROGRESS NOTES
[facility]     Gastroenterology Progress Note    Kasie Aguilar Patient Status:  Inpatient    1960 MRN [id number]   Location [facility] 5SW/SE Attending Jessika Garcia, [address] [facility] Day # 26 PCP None Pcp       Subjective in terms of mental status in the past 24 hours, after aggressive lactulose administration but now more drowsey possibly from dehydration with diarrhea from lactulose or from sedating medications      Recommend:  - Reduce lactulose to 30 g 2-3 times daily,

## 2021-11-21 PROCEDURE — 99233 SBSQ HOSP IP/OBS HIGH 50: CPT | Performed by: HOSPITALIST

## 2021-11-21 PROCEDURE — 99232 SBSQ HOSP IP/OBS MODERATE 35: CPT | Performed by: INTERNAL MEDICINE

## 2021-11-21 RX ORDER — LACTULOSE 10 G/15ML
20 SOLUTION ORAL 4 TIMES DAILY
Status: DISCONTINUED | OUTPATIENT
Start: 2021-11-21 | End: 2021-11-22

## 2021-11-21 RX ORDER — LACTULOSE 10 G/15ML
45 SOLUTION ORAL 4 TIMES DAILY
Status: DISCONTINUED | OUTPATIENT
Start: 2021-11-21 | End: 2021-11-21

## 2021-11-21 NOTE — PROGRESS NOTES
[facility]    Progress Note    Yvonna Coil Patient Status:  Inpatient    1960 MRN [id number]   Location [facility] 4W/SW/SE Attending Tonya Blanco, [address] [facility] Day # 27 PCP None Pcp       Subjective:   Yvonna Coil is 10 mg, Intravenous, Q4H PRN  acetaminophen (TYLENOL) tab 650 mg, 650 mg, Oral, Q6H PRN  tamsulosin (FLOMAX) cap 0.4 mg, 0.4 mg, Oral, Daily  morphINE sulfate (PF) 2 MG/ML injection 1 mg, 1 mg, Intravenous, Q2H PRN  polyethylene glycol (PEG 3350) (MIRALAX) 11/19/21  0543 11/20/21  0542   * 126* 92   BUN 13 13 15   CREATSERUM 0.69* 1.02 0.80   GFRAA 119 92 112   GFRNAA 103 80 97   CA 8.4* 9.2 8.4*    144 140   K 3.7 4.0 3.7    113* 109   CO2 23.0 23.0 23.0       Lab Results   Component Valu discussing plan of care, discussing labs and imaging findings. Spoke with consultant. All questions answered.

## 2021-11-21 NOTE — PROGRESS NOTES
[facility]     Gastroenterology Progress Note    Donnice Faizan Patient Status:  Inpatient    1960 MRN [id number]   Location [facility] 5SW/SE Attending Quinten Oconnell, [address] [facility] Day # 27 PCP None Pcp       Subjective Interval improvement in terms of mental status in the past 24 hours, after aggressive lactulose administration but now more drowsey possibly from dehydration with diarrhea from lactulose or from sedating medications      Recommend:  - Lactulose to 30 g 2-3

## 2021-11-22 ENCOUNTER — APPOINTMENT (OUTPATIENT)
Dept: GENERAL RADIOLOGY | Facility: HOSPITAL | Age: 61
DRG: 492 | End: 2021-11-22
Attending: ORTHOPAEDIC SURGERY
Payer: OTHER MISCELLANEOUS

## 2021-11-22 PROCEDURE — 99232 SBSQ HOSP IP/OBS MODERATE 35: CPT | Performed by: PHYSICIAN ASSISTANT

## 2021-11-22 PROCEDURE — 73600 X-RAY EXAM OF ANKLE: CPT | Performed by: ORTHOPAEDIC SURGERY

## 2021-11-22 PROCEDURE — 99233 SBSQ HOSP IP/OBS HIGH 50: CPT | Performed by: HOSPITALIST

## 2021-11-22 RX ORDER — LACTULOSE 10 G/15ML
20 SOLUTION ORAL 3 TIMES DAILY
Status: DISCONTINUED | OUTPATIENT
Start: 2021-11-22 | End: 2021-11-24

## 2021-11-22 NOTE — CM/SW NOTE
10: 45AM  Received notice from Cecilia Garza w/ [facility] - they have reached out to pt's Ar Hopson for determination. Per Cecilia Garza, if they deny, they can attempt insurance approval via pt's primary insurance.     As requested, JEAN CARLOS sent upda

## 2021-11-22 NOTE — OCCUPATIONAL THERAPY NOTE
OCCUPATIONAL THERAPY TREATMENT NOTE - INPATIENT    Room Number: 393/956-E               Problem List  Principal Problem:    Bimalleolar fracture of right ankle, open type I or II, initial encounter  Active Problems:    Type I or II open bimalleolar fractur training;Patient/Family education;Patient/Family training    SUBJECTIVE  \"Food. \"    OBJECTIVE  Precautions: Limb alert - right;Bed/chair alarm    WEIGHT BEARING RESTRICTION  R Upper Extremity: Non-Weight Bearing     R Lower Extremity: Non-Weight Bearing

## 2021-11-22 NOTE — PROGRESS NOTES
Faxed face sheet and order form to [facility] for CAM boot to be delivered this afternoon.

## 2021-11-22 NOTE — PROGRESS NOTES
[place] [facility] - [facility]    Progress Note    Cecilia Buenrostro Patient Status:  Inpatient    1960 MRN [id number]   Location [facility] 4W/SW/SE Attending Lesly Travis, [address] [facility] Day # 28 PCP None Pcp       Subjective:   Cecilia Buenrostro is (MIRALAX) powder packet 17 g, 17 g, Oral, Daily PRN  magnesium hydroxide (MILK OF MAGNESIA) 400 MG/5ML suspension 30 mL, 30 mL, Oral, Daily PRN  bisacodyl (DULCOLAX) rectal suppository 10 mg, 10 mg, Rectal, Daily PRN  Fleet Enema (FLEET) 7-19 GM/118ML enem Value Date    INR 2.14 (H) 11/22/2021    INR 2.08 (H) 11/21/2021    INR 2.25 (H) 11/20/2021           Assessment and Plan:      Delirium tremens. Tootie Persaud has had a fluctuating course but overall is improved.    In the setting of etoh abuse  Contin

## 2021-11-22 NOTE — PROGRESS NOTES
[facility]     Gastroenterology Progress Note    Curt Sheldon Patient Status:  Inpatient    1960 MRN [id number]   Location [facility] 5SW/SE Attending Sallee Dance, [address] [facility] Day # 28 PCP None Pcp     Subjective downtrending today. Continue to monitor. Recommend:  -lactulose 20 g - titrate to 2-3 BMs/day  -continue Xifaxan 550 mg PO BID  -minimize sedating agents  -treatment of UTI per primary  -strict documentation of BMs  -see above    Case d/w Dr. Dario Guzman and RN.

## 2021-11-22 NOTE — PROGRESS NOTES
[place] [facility] - [facility]    Progress Note    Kathleen Scale Patient Status:  Inpatient    1960 MRN [id number]   Location [facility] [place] 5SW/SE Attending Prashanth Amaya, [address] [facility] Day # 28 PCP None Pcp        Subjective:   Kathleen Scale is a Lateral ankle wound with dehiscence of the inferior aspect of the wound. Sutures were removed. Steri-Strips were added to reinforce the wound. I recommend daily dry dressing changes at the nursing home.   Close monitoring the wound for evidence of infect

## 2021-11-23 PROCEDURE — 99232 SBSQ HOSP IP/OBS MODERATE 35: CPT | Performed by: OTHER

## 2021-11-23 PROCEDURE — 99232 SBSQ HOSP IP/OBS MODERATE 35: CPT | Performed by: HOSPITALIST

## 2021-11-23 PROCEDURE — 99232 SBSQ HOSP IP/OBS MODERATE 35: CPT | Performed by: PHYSICIAN ASSISTANT

## 2021-11-23 NOTE — PROGRESS NOTES
[facility]     Gastroenterology Progress Note    Collins Palafox Patient Status:  Inpatient    1960 MRN [id number]   Location [facility] 5SW/SE Attending Trang Alonzo, [address] [facility] Day # 34 PCP None Pcp     Subjective although with some confusion today. Continue PO Lactulose at 20 g TID to titrate to 2-3 BMs/day. MELD 20.     Recommend:  -lactulose 20 g - titrate to 2-3 BMs/day  -continue Xifaxan 550 mg PO BID  -minimize sedating agents  -treatment of UTI per [facility]

## 2021-11-23 NOTE — PROGRESS NOTES
[facility]    Progress Note    Nurys Aguilera Patient Status:  Inpatient    1960 MRN [id number]   Location [facility] 4W/SW/SE Attending Maryln Denver, [address] [facility] Day # 29 PCP None Pcp       Subjective:   Nurys Aguilera is PRN  polyethylene glycol (PEG 3350) (MIRALAX) powder packet 17 g, 17 g, Oral, Daily PRN  magnesium hydroxide (MILK OF MAGNESIA) 400 MG/5ML suspension 30 mL, 30 mL, Oral, Daily PRN  bisacodyl (DULCOLAX) rectal suppository 10 mg, 10 mg, Rectal, Daily PRN  Fl Component Value Date    .0 (L) 11/23/2021    .0 11/19/2021    .0 (L) 11/18/2021       Recent Labs   Lab 11/19/21  0543 11/20/21  0542 11/23/21  0602   * 92 97   BUN 13 15 12   CREATSERUM 1.02 0.80 0.68*   GFRAA 92 112 120   GF                   Jolene Grullon DO  >35 min spent with patient. > 50% time was spent counseling patient, discussing plan of care, discussing labs and imaging findings. Spoke with consultant. All questions answered.

## 2021-11-23 NOTE — CM/SW NOTE
CM f/u on dc plan for [facility]     CM sent secure msg requesting update on auth. Eleuterio Deras [facility] [phone] has submitted for auth with WC and [employer]. Plan  [place] [address] pending ins auth.     / to remain available

## 2021-11-23 NOTE — PLAN OF CARE
Problem: Patient Centered Care  Goal: Patient preferences are identified and integrated in the patient's plan of care  Description: Interventions:  - What would you like us to know as we care for you?  \"I want to know where my wife is\"  - Provide timely and social influences on pain and pain management  - Manage/alleviate anxiety  - Utilize distraction and/or relaxation techniques  - Monitor for opioid side effects  - Notify MD/LIP if interventions unsuccessful or patient reports new pain  - Anticipate in needs post-hospital services based on physician/LIP order or complex needs related to functional status, cognitive ability or social support system  Outcome: Progressing     Problem: Delirium  Goal: Minimize duration of delirium  Description: Interventions

## 2021-11-24 ENCOUNTER — APPOINTMENT (OUTPATIENT)
Dept: GENERAL RADIOLOGY | Facility: HOSPITAL | Age: 61
DRG: 492 | End: 2021-11-24
Attending: HOSPITALIST
Payer: OTHER MISCELLANEOUS

## 2021-11-24 ENCOUNTER — APPOINTMENT (OUTPATIENT)
Dept: GENERAL RADIOLOGY | Facility: HOSPITAL | Age: 61
DRG: 492 | End: 2021-11-24
Attending: PHYSICIAN ASSISTANT
Payer: OTHER MISCELLANEOUS

## 2021-11-24 PROCEDURE — 71045 X-RAY EXAM CHEST 1 VIEW: CPT | Performed by: PHYSICIAN ASSISTANT

## 2021-11-24 PROCEDURE — 99233 SBSQ HOSP IP/OBS HIGH 50: CPT | Performed by: HOSPITALIST

## 2021-11-24 PROCEDURE — 71045 X-RAY EXAM CHEST 1 VIEW: CPT | Performed by: HOSPITALIST

## 2021-11-24 PROCEDURE — 99232 SBSQ HOSP IP/OBS MODERATE 35: CPT | Performed by: OTHER

## 2021-11-24 PROCEDURE — 99233 SBSQ HOSP IP/OBS HIGH 50: CPT | Performed by: PHYSICIAN ASSISTANT

## 2021-11-24 RX ORDER — RISPERIDONE 1 MG/1
1 TABLET, FILM COATED ORAL 2 TIMES DAILY
Status: DISCONTINUED | OUTPATIENT
Start: 2021-11-25 | End: 2021-12-10

## 2021-11-24 RX ORDER — TRAZODONE HYDROCHLORIDE 50 MG/1
50 TABLET ORAL NIGHTLY
Status: DISCONTINUED | OUTPATIENT
Start: 2021-11-24 | End: 2021-12-10

## 2021-11-24 RX ORDER — MAGNESIUM OXIDE 400 MG (241.3 MG MAGNESIUM) TABLET
400 TABLET ONCE
Status: COMPLETED | OUTPATIENT
Start: 2021-11-24 | End: 2021-11-24

## 2021-11-24 RX ORDER — LACTULOSE 10 G/15ML
20 SOLUTION ORAL 2 TIMES DAILY
Status: DISCONTINUED | OUTPATIENT
Start: 2021-11-24 | End: 2021-12-10

## 2021-11-24 NOTE — PROGRESS NOTES
[facility]     Gastroenterology Progress Note    Kathleen Scale Patient Status:  Inpatient    1960 MRN [id number]   Location [facility] 5SW/SE Attending Prashanth Amaya, [address] [facility] Day # 30 PCP None Pcp     Subjective No overt GI bleeding. CT A/P 11/16 without acute findings. He is tolerating PO lactulose however had 1 BM and two \"smears\" 11/23. He continues to take PO medications however less bowel movement output.  Will check ammonia level for baseline to determin 11/15/2021    PHOS 3.1 11/11/2021    TROP 0.061 ([facility]) 10/28/2021    B12 1,551 (H) 10/28/2021

## 2021-11-24 NOTE — PROGRESS NOTES
[facility]    Progress Note    Danis Candelaria Patient Status:  Inpatient    1960 MRN [id number]   Location [facility] 4W/SW/SE Attending Debora Burgess, [address] [facility] Day # 30 PCP None Pcp       Subjective:   Danis Candelaria is Daily  hydrALAzine HCl (APRESOLINE) injection 10 mg, 10 mg, Intravenous, Q4H PRN  acetaminophen (TYLENOL) tab 650 mg, 650 mg, Oral, Q6H PRN  tamsulosin (FLOMAX) cap 0.4 mg, 0.4 mg, Oral, Daily  morphINE sulfate (PF) 2 MG/ML injection 1 mg, 1 mg, Intravenou (L) 11/18/2021       Recent Labs   Lab 11/19/21  0543 11/20/21  0542 11/23/21  0602   * 92 97   BUN 13 15 12   CREATSERUM 1.02 0.80 0.68*   GFRAA 92 112 120   GFRNAA 80 97 104   CA 9.2 8.4* 8.3*    140 140   K 4.0 3.7 3.7   * 109 112   C consultant. All questions answered.

## 2021-11-24 NOTE — PLAN OF CARE
Problem: Patient Centered Care  Goal: Patient preferences are identified and integrated in the patient's plan of care  Description: Interventions:  - What would you like us to know as we care for you?  \"I want to know where my wife is\"  - Provide timely and social influences on pain and pain management  - Manage/alleviate anxiety  - Utilize distraction and/or relaxation techniques  - Monitor for opioid side effects  - Notify MD/LIP if interventions unsuccessful or patient reports new pain  - Anticipate in Placement verified with CXR. TF infusing at 20ml/hr and to be increased by 10ml/hr every 12 hours to goal of 80ml/hr. Pt tolerated well. Soft wrists restraints in place. Assessment performed every 2 hours, per order. Right ankle dressing changed.  On co

## 2021-11-24 NOTE — PLAN OF CARE
Pt confused, hallucinating at times, and getting out out bed requiring frequent redirection and education. Fall risk precautions maintained. Frequent rounding by staff and video monitoring in place for safety. Vitals stable at this time.  Will continue to m to monitor pain and request assistance  - Assess pain using appropriate pain scale  - Administer analgesics based on type and severity of pain and evaluate response  - Implement non-pharmacological measures as appropriate and evaluate response  - Consider post-discharge preferences of patient/family/discharge partner  - Complete POLST form as appropriate  - Assess patient's ability to be responsible for managing their own health  - Refer to Case Management Department for coordinating discharge planning if t

## 2021-11-24 NOTE — PROGRESS NOTES
Patient is a 62year old   male with history of alcohol dependence, alcoholic cirrhosis liver, BPH and history of renal cancer who presented to the hospital post fall and found with female oral fracture of right ankle s/p open reduction. Patient is a former smoker supposedly he quit in 2009. Patient is heavy in alcohol abuse. Amount not been specified.       Medical History:       Past Medical History  Past Medical History:   Diagnosis Date   • Alcoholic cirrhosis of liver ([facility] [address]    • BPH injection 2 mg, 2 mg, Intravenous, Q2H PRN  multivitamin with minerals (ADULT) tab 1 tablet, 1 tablet, Oral, Daily  folic acid (FOLVITE) tab 1 mg, 1 mg, Oral, Daily  hydrALAzine HCl (APRESOLINE) injection 10 mg, 10 mg, Intravenous, Q4H PRN  acetaminophen ( ALKPHO 115 11/23/2021    TP 6.1 (L) 11/23/2021    AST 74 (H) 11/23/2021    ALT 47 11/23/2021    PTT 40.4 (H) 10/28/2021    INR 1.96 (H) 11/23/2021    PTP 22.1 (H) 11/23/2021    MG 1.9 11/15/2021    PHOS 3.1 11/11/2021    TROP 0.061 ([facility]) 10/28/2021    B1 fluctuation in his cognitive function    Impression:     Impression:    Korsakoff syndrome  Delirium tremens. Episodic mood disorder, agitation. Alcohol dependence, continuous.   Bimalleolar fracture of right ankle, open type I or II, initial encounter  T With Platelet      Renal Function Panel      CBC With Differential With Platelet      Magnesium      Renal Function Panel      Hepatic Function Panel (7)      Magnesium      Magnesium      Ammonia, Plasma      CBC With Differential With Platelet      Basic 325 MG Oral Tab 60 tablet 0     Sig: Take 1 tablet (325 mg total) by mouth 2 (two) times daily. • Senna-Docusate Sodium 8.6-50 MG Oral Tab 60 tablet 0     Sig: Take 2 tablets by mouth daily.  Hold if diarrhea   • HYDROcodone-acetaminophen 5-325 MG Oral Ta

## 2021-11-24 NOTE — DIETARY NOTE
ADULT NUTRITION REASSESSMENT    Pt is at moderate nutrition risk. Pt does not meet malnutrition criteria. RECOMMENDATIONS TO MD:  Recommend initiation of nutrition support within 2-3 days if po not improved.       ADMITTING DIAGNOSIS:   Type I or II o but still no improved intake. Discussed recommendation for NGT for temporary TF until pt mental status improves and PO intake improves. MD and GI agreeable. DHT placed and placement confirmed per RN. TF order as below. Pt is at refeeding risk.  TF to start and pushing wife away.    - Fluid Accumulation: small amount per RN documentation  - Skin Integrity: surgical wound(s) per RN documentation    ANTHROPOMETRICS:  HT: 177.8 cm (5' 10\")  WT: 82.7 kg (182 lb 4.8 oz)--no more current wt, would not be accurate g time  - Coordination of nutrition care: collaboration with other providers  - Discharge and transfer of nutrition care to new setting or provider: monitor plans    MONITOR AND EVALUATE/NUTRITION GOALS:  - Food and Nutrient Intake:      Monitor: adequacy of

## 2021-11-25 PROCEDURE — 99233 SBSQ HOSP IP/OBS HIGH 50: CPT | Performed by: HOSPITALIST

## 2021-11-25 PROCEDURE — 99232 SBSQ HOSP IP/OBS MODERATE 35: CPT | Performed by: INTERNAL MEDICINE

## 2021-11-25 NOTE — PROGRESS NOTES
[facility]     Gastroenterology Progress Note    Bertha Fuller Patient Status:  Inpatient    1960 MRN [id number]   Location [facility] 5SW/SE Attending David Madden, [address] [facility] Day [address] PCP None Pcp       Assessme acute distress  HEENT:Negative for scleral icterus. Mucous membranes are moist.  Cardiovascular: Regular rate and rhythm   Lung: Clear to auscultation bilaterally  Abdomen:Non-distended. Bowel sounds are present. There is no tenderness to palpation.   Loletta Gilberton Ref Rng & Units 11/1/2021 10/30/2021   AMMONIA      11 - 32 umol/L 28 63 (H)     XR ANKLE (2 VIEW), RIGHT (CPT=73600)    Result Date: 11/22/2021  CONCLUSION:  1. Status post ORIF distal fibular fracture.  2. Minimal residual widening of the medial and later

## 2021-11-25 NOTE — PLAN OF CARE
Patient remains confused and agitated throughout the night. Patient removed NGT; MD notified. Soft restraints continue in place. Fall precautions in place. Frequent monitoring by staff. Continue to monitor.       Problem: Patient Centered Care  Goal: Lauro comfort level or patient's stated pain goal  Description: INTERVENTIONS:  - Encourage pt to monitor pain and request assistance  - Assess pain using appropriate pain scale  - Administer analgesics based on type and severity of pain and evaluate response  - needed discharge resources and transportation as appropriate  - Identify discharge learning needs (meds, wound care, etc)  - Arrange for interpreters to assist at discharge as needed  - Consider post-discharge preferences of patient/family/discharge partne

## 2021-11-25 NOTE — PLAN OF CARE
Patient has soft restraints due to pulling at equipment. Patient has safety precautions in place bed in the lowest position, bed alarm on, and call light within reach. Continue to monitor patient with frequent nursing rounds.    Problem: Patient Centered Ca Birgit Ibarra RN  Outcome: Progressing  11/25/2021 1449 by Birgit Ibarra RN  Outcome: Progressing     Problem: PAIN - ADULT  Goal: Verbalizes/displays adequate comfort level or patient's stated pain goal  Description: INTERVENTIONS:  - Encourage pt to [facility] Progressing  11/25/2021 1449 by Ramón Rangel RN  Outcome: Progressing     Problem: DISCHARGE PLANNING  Goal: Discharge to home or other facility with appropriate resources  Description: INTERVENTIONS:  - Identify barriers to discharge w/pt and caregiver  - delirium, medications)  - Discontinue any unnecessary medical devices as soon as possible  - Assess the patient's physical comfort, circulation, skin condition, hydration, nutrition and elimination needs   - Reorient and redirection as needed  - Assess for

## 2021-11-25 NOTE — PROGRESS NOTES
Patient is a 62year old   male with history of alcohol dependence, alcoholic cirrhosis liver, BPH and history of renal cancer who presented to the hospital post fall and found with female oral fracture of right ankle s/p open reduction. [facility])    • Portal hypertension ([facility] [address]    • Renal cancer, left [facility])        Past Surgical History  Past Surgical History:   Procedure Laterality Date   • IR TIPS PROCEDURE  01/24/2014   • LAPAROSCOPY, SURGICAL; PARTIAL NEPHRECTOMY  02/28/2014   • LIPOMA SEVERIANO MG/ML injection 1 mg, 1 mg, Intravenous, Q2H PRN  polyethylene glycol (PEG 3350) (MIRALAX) powder packet 17 g, 17 g, Oral, Daily PRN  magnesium hydroxide (MILK OF MAGNESIA) 400 MG/5ML suspension 30 mL, 30 mL, Oral, Daily PRN  bisacodyl (DULCOLAX) rectal giraldo Status post ORIF distal fibular fracture. 2. Minimal residual widening of the medial and lateral tibial talar joint space.     Dictated by (CST): Jalyn Dodson MD on 11/22/2021 at 3:39 PM     Finalized by (CST): Jalyn Dodson MD on 11/22 distractibility  Memory: Impaired due to recent delirium  Intellect: Limited due to his confusion  Judgment and Insight: Limited due to his poor and fluctuation in his cognitive function    Impression:     Impression:    Korsakoff syndrome  Delirium tremen Reticulocyte Count      Magnesium      Ammonia, Plasma      CBC With Differential With Platelet      Renal Function Panel      CBC With Differential With Platelet      Magnesium      Renal Function Panel      Hepatic Function Panel (7)      Magnesium (Blood Type)      Antibody Screen      Prepare platelets Once      Specimen to Pathology Tissue      Rapid SARS-CoV-2 by PCR      Rapid SARS-CoV-2 by PCR      Blood Culture      Urine Culture, Routine      Meds This Visit:  Requested Prescriptions     Sign

## 2021-11-25 NOTE — PROGRESS NOTES
[facility] [place] [facility]    Progress Note    Kev Clifton Patient Status:  Inpatient    1960 MRN [id number]   Location [facility] 4W/SW/SE Attending Mitchell Cruz, [address] [facility] Day [address] PCP None Pcp       Subjective:   Kev Clifton is Daily  morphINE sulfate (PF) 2 MG/ML injection 1 mg, 1 mg, Intravenous, Q2H PRN  polyethylene glycol (PEG 3350) (MIRALAX) powder packet 17 g, 17 g, Oral, Daily PRN  magnesium hydroxide (MILK OF MAGNESIA) 400 MG/5ML suspension 30 mL, 30 mL, Oral, Daily PRN Mariah Aguirre MD on 11/24/2021 at 2:27 PM                Results:     CBC:    Lab Results   Component Value Date    WBC 4.6 11/25/2021    WBC 5.6 11/23/2021    WBC 5.9 11/19/2021     Lab Results   Component Value Date    HGB 9.2 (L) 11/25/2021    HGB 9.6 (L) who is unreliable. no note of this by RN. Hb stable.  Cont to monitor - hb stable      UTI - sp 5 days of cipro      Other problems  Elevated troponin of unclear significance  Cirrhosis  Thrombocytopenia  Alcohol abuse  Jaundice    Malnutrition - pt pulled o

## 2021-11-26 PROCEDURE — 99232 SBSQ HOSP IP/OBS MODERATE 35: CPT | Performed by: PHYSICIAN ASSISTANT

## 2021-11-26 PROCEDURE — 99233 SBSQ HOSP IP/OBS HIGH 50: CPT | Performed by: HOSPITALIST

## 2021-11-26 NOTE — PROGRESS NOTES
[facility]    Progress Note    Tawnya Posadas Patient Status:  Inpatient    1960 MRN [id number]   Location [facility] 4W/SW/SE Attending oYbany Tapia, [address] [facility] Day # 32 PCP None Pcp       Subjective:   Tawnya Posadas is injection 1 mg, 1 mg, Intravenous, Q2H PRN  polyethylene glycol (PEG 3350) (MIRALAX) powder packet 17 g, 17 g, Oral, Daily PRN  magnesium hydroxide (MILK OF MAGNESIA) 400 MG/5ML suspension 30 mL, 30 mL, Oral, Daily PRN  bisacodyl (DULCOLAX) rectal supposit PM                Results:     CBC:    Lab Results   Component Value Date    WBC 4.6 11/25/2021    WBC 5.6 11/23/2021    WBC 5.9 11/19/2021     Lab Results   Component Value Date    HGB 9.2 (L) 11/25/2021    HGB 9.6 (L) 11/23/2021    HGB 10.8 (L) 11/19/202 stable. Cont to monitor - hb stable      UTI - sp 5 days of cipro      Other problems  Elevated troponin of unclear significance  Cirrhosis  Thrombocytopenia  Alcohol abuse  Jaundice    Malnutrition - pt pulled out ngt 2 x. Hold for now.  Cont ensure      d

## 2021-11-26 NOTE — PROGRESS NOTES
[facility]     Gastroenterology Progress Note    Olivia Bell Patient Status:  Inpatient    1960 MRN [id number]   Location [facility] 5SW/SE Attending Rod Cohen, [address] [facility] Day # 28 PCP None Pcp     Subjective needed and outpatient f/u with [facility].      Recommend:  -continue Xifaxan 550 mg PO BID  -lactulose PO and enemas PRN - titrate to 2-3 BMs/day  -minimize sedating agents  -outpatient f/u with hepatology  -see above    Case d/w Dr. Silvana Sinha an

## 2021-11-26 NOTE — OCCUPATIONAL THERAPY NOTE
OCCUPATIONAL THERAPY TREATMENT NOTE - INPATIENT        Room Number: 242/065-Z                Problem List  Principal Problem:    Bimalleolar fracture of right ankle, open type I or II, initial encounter  Active Problems:    Type I or II open bimalleolar fr from Erik Diego. DISCHARGE RECOMMENDATIONS  OT Discharge Recommendations: Sub-acute rehabilitation  OT Device Recommendations: TBD     PLAN  OT Treatment Plan: Balance activities; ADL training;Functional transfer training;UE strengthening/ROM; Endurance training with min a and RW  Comment: ongoing    Patient will complete self care task at sink level with min a and RW  Comment: ongoing    Patient will demonstrate R LE NWB adherence during ADL completion  Comment: ongoing            Goals  on: 21 --> co

## 2021-11-26 NOTE — CM/SW NOTE
JEAN CARLOS sent updates to [facility] via Aidin. JEAN CARLOS sent Aidin message and left Vmail for liaison Bernice Joseph to inquire about status of insurance approval.    UPDATE: Received notice from Bernice Joseph w/ [employer] pt's Worker's Comp is not in office today.  Per Bernice Joseph

## 2021-11-26 NOTE — PAYOR COMM NOTE
--------------  CONTINUED STAY REVIEW    Payor: WORKERS COMP  Subscriber #:  [id number]  Authorization Number: [id number]    Admit date: 10/25/21  Admit time:  8:23 PM    Admitting Physician: Bunny Ribeiro MD  Attending Physician:  DO STEPHANI Mae 11/26/21 1022 98.4 °F (36.9 °C) 83 18 104/56 100 % — None (Room air) —     11/26/21 0536 98.2 °F (36.8 °C) 102 18 112/67 99 % — None (Room air) — SK    11/25/21 2027 99.1 °F (37.3 °C) 91 18 102/63 99 % — None (Room air) — SK    11/25/21 1729 — — — 101/57 0200 7 KL    11/06/21 0000 3 KL    11/05/21 2200 5 KL    11/05/21 2000 6 KL    11/05/21 1900 2 KL    11/05/21 1759 0 AG    11/05/21 1400 0 AG    11/05/21 1223 0 AG    11/05/21 1000 0 AG    11/05/21 0808 1 AG    11/05/21 0620 0 JT    11/05/21 0400 0 JT    1 Took oral meds well per RN. Having bm's            Objective:   Blood pressure 104/56, pulse 83, temperature 98.4 °F (36.9 °C), temperature source Oral, resp.  rate 18, height 5' 10\" (1.778 m), weight 182 lb 4.8 oz (82.7 kg), SpO2 100 %.     General ap debridement on 10/25. -now s/p RIGHT ANKLE FRACTURE EXTERNAL FIXATION REMOVAL AND OPEN REDUCTION INTERNAL FIXATION  -Orthopedics on consult   -Pain controlled  -cont dvt ppx  - f/u with Dr Crystal Christensen in 2 weeks      Coagulopathy.  Due to liver.   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

## 2021-11-26 NOTE — DIETARY NOTE
Brief Nutrition Update Note    Update 11/26/21:  Pt seen for follow up. Pt remains confused at times. NGT placed 11/24 and pt pulled that evening. Since NGT pulled, intake has improved. Eating ~70% x4 meals documented since last visit.  Per RN, pt ate 100%

## 2021-11-26 NOTE — PHYSICAL THERAPY NOTE
PHYSICAL THERAPY TREATMENT NOTE - INPATIENT     Room Number: 899/661-X       Presenting Problem: Open ankle fx with I and D with External fixator on 10/25/21 .   Hospital course complicated by  Delirium / coagulopathy [facility] in chair at end of session with needs in reach, LE's elevated, chair alarm activated. The patient's Approx Degree of Impairment: 57.7% has been calculated based on documentation in the [facility] '6 clicks' Inpatient Basic Mobility Short Form. follow)  Distance (ft): 24ft  Assistive Device: Rolling walker  Pattern:  (max cues for compliance to maintain NWB'ing- RLE)  Stairs:  (NOT tested )    Patient End of Session: Up in chair;Needs met;Call light within reach;RN aware of session/findings; Alarm

## 2021-11-27 NOTE — PLAN OF CARE
Still confused. Wearing CAM boot but often not compliant with wt bearing restrictions. Eating better today. Wife visited.   Problem: Patient Centered Care  Goal: Patient preferences are identified and integrated in the patient's plan of care  Description and evaluate response  - Implement non-pharmacological measures as appropriate and evaluate response  - Consider cultural and social influences on pain and pain management  - Manage/alleviate anxiety  - Utilize distraction and/or relaxation techniques  - M for managing their own health  - Refer to Case Management Department for coordinating discharge planning if the patient needs post-hospital services based on physician/LIP order or complex needs related to functional status, cognitive ability or social sup

## 2021-11-27 NOTE — PROGRESS NOTES
[facility]    Progress Note    Munira De Patient Status:  Inpatient    1960 MRN [id number]   Location [facility] 5SW/SE Attending Sánchez Storey MD   [facility] Day # 35 PCP None Pcp     Subjective:   Subjective:  Lauro and rectal enema  - titrate to 2-3 BMs/day-> A and O x 3 today.   -continue Xifaxan 550 mg PO BID  -minimize sedating agents  -strict documentation of BMs  -outpatient f/u with hepatology at [facility] 6 folic acid, thiamine and mv.   -restraints, posey

## 2021-11-28 PROCEDURE — 99233 SBSQ HOSP IP/OBS HIGH 50: CPT | Performed by: HOSPITALIST

## 2021-11-28 NOTE — PLAN OF CARE
Eating very well and taking supplements. Loose stools with lactulose. Mostly compliant with non weight bearing right let. Today knew it was November of 2021 and that he was in a hospital.  Continue use of CAM boot.   To go to [facility] insurance enrike mondragon monitor pain and request assistance  - Assess pain using appropriate pain scale  - Administer analgesics based on type and severity of pain and evaluate response  - Implement non-pharmacological measures as appropriate and evaluate response  - Consider cul post-discharge preferences of patient/family/discharge partner  - Complete POLST form as appropriate  - Assess patient's ability to be responsible for managing their own health  - Refer to Case Management Department for coordinating discharge planning if t

## 2021-11-28 NOTE — PLAN OF CARE
Pt is POD #17-18. Vital signs stable. 250cc bolus provided for BP 88/43. Up to 96/54 post bolus. Will continue to monitor. Denies pain. Alert and oriented x1-2. Disoriented to time, situation, and occasionally place. Impulsive. Video monitoring in use.  On Anticipate increased pain with activity and pre-medicate as appropriate  Outcome: Progressing     Problem: RISK FOR INFECTION - ADULT  Goal: Absence of fever/infection during anticipated neutropenic period  Description: INTERVENTIONS  - Monitor WBC  - Admi Interventions:  - Encourage use of hearing aids, eye glasses  - Promote highest level of mobility daily  - Provide frequent reorientation  - Promote wakefulness i.e. lights on, blinds open  - Promote sleep, encourage patient's normal rest cycle i.e. lights

## 2021-11-28 NOTE — PROGRESS NOTES
[place] [facility]    Progress Note    Derotha Roads Patient Status:  Inpatient    1960 MRN [id number]   Location [facility] 5SW/SE Attending Nito Mar MD   [facility] Day # 34 PCP None Pcp     Subjective:   Subjective:  Lauro haldol, cont thiamine, dc zyprexa, cont - depakote, start trazodone,   -Mental status currently stable    Hepatic encephalopathy - improved   - apprec GI consult --lactulose 20 g oral and rectal enema  - titrate to 2-3 BMs/day-> A and O x 3 today.   -mustapha

## 2021-11-29 PROCEDURE — 99233 SBSQ HOSP IP/OBS HIGH 50: CPT | Performed by: HOSPITALIST

## 2021-11-29 NOTE — CM/SW NOTE
Ryan sent clinical upd to [facility]. with request for update on wc review for ins auth. [id number]  Per liaison she rec'd an email stating pts wc will have a status hearing on Wednesday to determine status of auth.     RYAN sent upd to [facility] [place] [phone] met with spouse to discuss. She has recently had back surgery and is not able to assist pt with mobility/safety. Wife wants to continue to pursue ins auth for ALEXANDRA.     CM notified wife that if ins Rossana Holder is denied then Cm can provide a list of agencies availa [Disease: _____________________] : Disease: [unfilled] [T: ___] : T[unfilled] [AJCC Stage: ____] : AJCC Stage: [unfilled] [N: ___] : N[unfilled] [de-identified] : 48 y/o perimenopausal woman presented with an abnormality in her right breast  on a screening mammogram . Biopsy confirmed cancer. Breast MRI showed 5.5 area on enhancement around biopsy site. Patient elected to have bilateral mastectomies. Genetic testing Color was negative.\par On 2/13/19 she underwent nipple sparing mastectomies, right sentinel LN biopsy was positive- followed by completion of axillary dissection and LYMPHA procedure and JASMYNE flap reconstruction.\par Pathology revealed 5 mm invasive ductal cancer mod differentiated , strongly ER/NY positive and HER 2 negative , metastatic to 10/14 axillary LNs ( largest metastasis 1 cm  with extranodal extension). \par \par CT chest, abdomen and pelvis and bone scan - negative for metastatic disease ( PET not covered by insurance).\par  \par Started adjuvant chemotherapy DD ACT on 3/26/19. \par \par  [de-identified] : moderately differentiated invasive ductal cancer  SBR 6/9  [de-identified] : ER 95 %  MD 95 %  HER 2 + 1 neg ( biopsy)  [de-identified] : Genetic testing Color negative\par \par Receptor studies on metastatic  lymph node  ER> 95 % UT > 95 %  HER 2 neg [de-identified] : Second cycle of  AC on 4/9/19.\par \par Tolerated well. Less fatigue.No nausea. No mucositis. \par \par Difficulty sleeping- Xanax at bedtime helps a lot. \par \par

## 2021-11-29 NOTE — PLAN OF CARE
Problem: Patient Centered Care  Goal: Patient preferences are identified and integrated in the patient's plan of care  Description: Interventions:  - What would you like us to know as we care for you?  \"I want to know where my wife is\"  - Provide timely and social influences on pain and pain management  - Manage/alleviate anxiety  - Utilize distraction and/or relaxation techniques  - Monitor for opioid side effects  - Notify MD/LIP if interventions unsuccessful or patient reports new pain  - Anticipate in needs post-hospital services based on physician/LIP order or complex needs related to functional status, cognitive ability or social support system  Outcome: Progressing     Problem: Safety Risk - Non-Violent Restraints  Goal: Patient will remain free from

## 2021-11-29 NOTE — PROGRESS NOTES
[facility]    Progress Note    Cecilia Fearing Patient Status:  Inpatient    1960 MRN [id number]   Location [facility] 5SW/SE Attending Jennifer De La Rosa MD   [facility] Day # 28 PCP None Pcp     Subjective:   Subjective:  Patien apprec psych consult - dc haldol, cont thiamine, dc zyprexa, cont - depakote, start trazodone,   -Mental status currently stable    Hepatic encephalopathy - improved   - apprec GI consult --lactulose 20 g oral and rectal enema  - titrate to 2-3 BMs/day-> A

## 2021-11-29 NOTE — PLAN OF CARE
Up with Cam boot with walker and 1 assist and reinforced to be non-weightbearing on R foot and he verbalized that he knew that. No c/o pain voiced. Dressing to R ankle changed. Cam boot on at all times.   Problem: Patient Centered Care  Goal: Patient prefer appropriate pain scale  - Administer analgesics based on type and severity of pain and evaluate response  - Implement non-pharmacological measures as appropriate and evaluate response  - Consider cultural and social influences on pain and pain management home or other facility with appropriate resources  Description: INTERVENTIONS:  - Identify barriers to discharge w/pt and caregiver  - Include patient/family/discharge partner in discharge planning  - Arrange for needed discharge resources and transportati

## 2021-11-30 ENCOUNTER — APPOINTMENT (OUTPATIENT)
Dept: GENERAL RADIOLOGY | Facility: HOSPITAL | Age: 61
DRG: 492 | End: 2021-11-30
Attending: ORTHOPAEDIC SURGERY
Payer: OTHER MISCELLANEOUS

## 2021-11-30 PROCEDURE — 99233 SBSQ HOSP IP/OBS HIGH 50: CPT | Performed by: HOSPITALIST

## 2021-11-30 PROCEDURE — 73600 X-RAY EXAM OF ANKLE: CPT | Performed by: ORTHOPAEDIC SURGERY

## 2021-11-30 NOTE — PLAN OF CARE
Patient alert and oriented x3, denies pain. Resting in bed. Call light in reach, bed alarm on. Up with 1 assist and walker, Non-weight bearing to R foot.     Problem: Patient Centered Care  Goal: Patient preferences are identified and integrated in the jeniffer based on type and severity of pain and evaluate response  - Implement non-pharmacological measures as appropriate and evaluate response  - Consider cultural and social influences on pain and pain management  - Manage/alleviate anxiety  - Utilize distractio patient's ability to be responsible for managing their own health  - Refer to Case Management Department for coordinating discharge planning if the patient needs post-hospital services based on physician/LIP order or complex needs related to functional sta

## 2021-11-30 NOTE — PLAN OF CARE
Patient is A&Ox's 3. Patient has wound consult on board. Patient has safety precautions in place bed alarm on, bed in the lowest position, and call light within reach. Continue to monitor patient with frequent nursing rounds.     Problem: Patient Centered C assistance  - Assess pain using appropriate pain scale  - Administer analgesics based on type and severity of pain and evaluate response  - Implement non-pharmacological measures as appropriate and evaluate response  - Consider cultural and social influenc patient/family/discharge partner  - Complete POLST form as appropriate  - Assess patient's ability to be responsible for managing their own health  - Refer to Case Management Department for coordinating discharge planning if the patient needs post-hospital

## 2021-11-30 NOTE — PROGRESS NOTES
[facility]    Progress Note    Round Lakelizzie Fuller Patient Status:  Inpatient    1960 MRN [id number]   Location [facility] 5SW/SE Attending Brenda Ayala MD   [facility] Day # 36 PCP None Pcp        Subjective:   Bertha Fuller is CA 7.8 (L) 11/30/2021    ALB 1.5 (L) 11/30/2021    ALKPHO 109 11/30/2021    BILT 3.6 (H) 11/30/2021    TP 5.4 (L) 11/30/2021    AST 71 (H) 11/30/2021    ALT 50 11/30/2021    PTT 40.4 (H) 10/28/2021    INR 2.12 (H) 11/24/2021    MG 2.1 11/30/2021    PHOS

## 2021-11-30 NOTE — PROGRESS NOTES
[facility]    Progress Note    Hector Harrington Patient Status:  Inpatient    1960 MRN [id number]   Location [facility] 5SW/SE Attending Marcell Bowman MD   [facility] Day # 39 PCP None Pcp     Subjective:   Subjective:  Patien encephalopathy - improved   - apprec GI consult --lactulose 20 g oral and rectal enema  - titrate to 2-3 BMs/day-> A and O x 3 today.   -continue Xifaxan 550 mg PO BID  -minimize sedating agents  -strict documentation of BMs  -outpatient f/u with hepatolog

## 2021-11-30 NOTE — PROGRESS NOTES
11/30/21 1334   Wound 11/10/21 Incision Ankle Anterior;Right   Date First Assessed/Time First Assessed: 11/10/21 1427   Primary Wound Type: Incision  Location: Ankle  Wound Location Orientation: Anterior;Right   Wound Image    Site Assessment Anastasiia; Romy Pandey Davie De La Rosa MD Consult new  Reason for Consult debridement dressing for dehiscence of right ankle wound. ASSESSMENT   Antelmo Score:  Antelmo Scale Score: 19    Chart Reviewed: yes    Wound(s):  Pt seen sitting up in bed.  Pt i

## 2021-12-01 PROCEDURE — 99233 SBSQ HOSP IP/OBS HIGH 50: CPT | Performed by: HOSPITALIST

## 2021-12-01 NOTE — PHYSICAL THERAPY NOTE
PHYSICAL THERAPY TREATMENT NOTE - INPATIENT     Room Number: 134/714-Y       Presenting Problem: Open ankle fx with I and D with External fixator on 10/25/21 .   Hospital course complicated by  Delirium / coagulopathy related to [facility]. Pt performed supine to sit tx at supervision. Able to don L shoe in seated at supervision for balance. Pt performed 1st sit to stand tx with rolling walker at MIN A, verbal cues for RLE positioning and proper hand placement.  Pt ambulated 2 -    ACTIVITY TOLERANCE  Room air, no SOB or signs of acute distress with mobility    AM-PAC '6-Clicks' INPATIENT SHORT FORM - BASIC MOBILITY  How much difficulty does the patient currently have. ..   Patient Difficulty: Turning over in bed (including adjust at [facility] A to CGA   Goal #3 Pt able to stand with rolling walker RLE NWB x 1 min at supervision for balance   Goal #3   Current Status CGA for 1 minute with bilateral UE support on RW   Goal #4 Patient is able to ambulate 30 ft with rolling walker at [facility]

## 2021-12-01 NOTE — PROGRESS NOTES
[facility]    Progress Note    Danis Candelaria Patient Status:  Inpatient    1960 MRN [id number]   Location [facility] 5SW/SE Attending Corina Merchant MD   Hosp Day # 40 PCP None Pcp     Subjective:   Subjective:  Lauro 11/30/2021 at 1:38 PM     Finalized by (CST): Karan Dodson MD on 11/30/2021 at 1:40 PM                Assessment & Plan:      Delirium tremens.  Severe. - Patient has had a fluctuating course but overall is improved.    - apprec psych consult - time spent counseling patient and family members.  re: treatment and plan          Sudarshan Carr MD

## 2021-12-01 NOTE — PLAN OF CARE
Patient is A&Ox's 3. Patient has safety precautions in place bed alarm on, bed in the lowest position, and call light within reach. Continue to monitor patient with frequent nursing rounds.   Problem: Patient Centered Care  Goal: Patient preferences are iden scale  - Administer analgesics based on type and severity of pain and evaluate response  - Implement non-pharmacological measures as appropriate and evaluate response  - Consider cultural and social influences on pain and pain management  - Manage/alleviat form as appropriate  - Assess patient's ability to be responsible for managing their own health  - Refer to Case Management Department for coordinating discharge planning if the patient needs post-hospital services based on physician/LIP order or complex n

## 2021-12-02 PROCEDURE — 99233 SBSQ HOSP IP/OBS HIGH 50: CPT | Performed by: HOSPITALIST

## 2021-12-02 NOTE — PROGRESS NOTES
[facility]    Progress Note    Corrinne Beath Patient Status:  Inpatient    1960 MRN [id number]   Location [facility] 5SW/SE Attending Maribel Bone MD   [facility] Day # 45 PCP None Pcp     Subjective:   Subjective:  Lauro 11/30/2021 at 1:38 PM     Finalized by (CST): Latricia Dodson MD on 11/30/2021 at 1:40 PM                Assessment & Plan:      Delirium tremens.  Severe. - Patient has had a fluctuating course but overall is improved.    - apprec psych consult - closely  -Appreciate recommendations    Greater than 35 min spent with >50% time spent counseling patient and family members.  re: treatment and plan          Radha Ng MD

## 2021-12-02 NOTE — OCCUPATIONAL THERAPY NOTE
OCCUPATIONAL THERAPY TREATMENT NOTE - INPATIENT        Room Number: 343/952-F                Problem List  Principal Problem:    Bimalleolar fracture of right ankle, open type I or II, initial encounter  Active Problems:    Type I or II open bimalleolar fr Device Recommendations: Grab bars; Shower chair     PLAN  OT Treatment Plan: Balance activities; ADL training;Functional transfer training; Endurance training    SUBJECTIVE  Pt seen sitting at EOB, and agreeable for OT session     OBJECTIVE  Precautions: Limb role of OT , LE dressing, standing tolerance with NWB status RLE, safety with transfers and ambulation with RW for support  Patient End of Session: In bed;Needs met;Call light within reach;RN aware of session/findings; Alarm set; Family present    OT Goals:

## 2021-12-02 NOTE — DIETARY NOTE
ADULT NUTRITION REASSESSMENT    Pt is at moderate nutrition risk. Pt does not meet malnutrition criteria.       RECOMMENDATIONS TO MD: None at this time    ADMITTING DIAGNOSIS: Type I or II open bimalleolar fracture of left ankle, initial encounter [S82.84 for temporary TF until pt mental status improves and PO intake improves. MD and GI agreeable. DHT placed and placement confirmed per RN. TF order as below. Pt is at refeeding risk. TF to start low and increase slowly.    Update 11/26/21:  Pt seen for follow 26.0 28.0   PHOS  --  3.5   OSMOCALC 291 291     GASTROINTESTINAL: +BM watery brown medium stool 12/2    NUTRITION RELATED PHYSICAL FINDINGS:  - Nutrition Focused Physical Exam (NFPE): Nutrition Focused Physical Exam (NFPE): mostly nourished, some areas ap Rational/use of oral supplements discussed.   - Vitamin and mineral supplements: multivitamin/mineral, thiamin and folic acid/ MD  - Feeding assistance: meal set up  - Nutrition education: not appropriate at this time  - Coordination of nutrition care: alesia

## 2021-12-03 PROCEDURE — 99232 SBSQ HOSP IP/OBS MODERATE 35: CPT | Performed by: HOSPITALIST

## 2021-12-03 NOTE — PLAN OF CARE
No complaints of pain. Up to chair today. Boot in place. Bed in lowest position and safety precautions in place. Discharge pending insurance auth.    Problem: Patient Centered Care  Goal: Patient preferences are identified and integrated in the patient's pl type and severity of pain and evaluate response  - Implement non-pharmacological measures as appropriate and evaluate response  - Consider cultural and social influences on pain and pain management  - Manage/alleviate anxiety  - Utilize distraction and/or ability to be responsible for managing their own health  - Refer to Case Management Department for coordinating discharge planning if the patient needs post-hospital services based on physician/LIP order or complex needs related to functional status, cogni

## 2021-12-03 NOTE — PROGRESS NOTES
12/03/21 1428   Wound 11/10/21 Incision Ankle Anterior;Right   Date First Assessed/Time First Assessed: 11/10/21 1427   Primary Wound Type:  Incision  Location: Ankle  Wound Location Orientation: Anterior;Right   Wound Image    Site Assessment Dry;Fragil

## 2021-12-03 NOTE — PROGRESS NOTES
[facility]    Progress Note    Renay Oconnell Patient Status:  Inpatient    1960 MRN [id number]   Location [facility] 5SW/SE Attending Corbin Iverson, [address] Day # 39 PCP None Pcp       Subjective:   Renay Oconnell is a( (MIRALAX) powder packet 17 g, 17 g, Oral, Daily PRN  magnesium hydroxide (MILK OF MAGNESIA) 400 MG/5ML suspension 30 mL, 30 mL, Oral, Daily PRN  bisacodyl (DULCOLAX) rectal suppository 10 mg, 10 mg, Rectal, Daily PRN  Fleet Enema (FLEET) 7-19 GM/118ML enem Delirium tremens.  Severe. - Patient has had a fluctuating course but overall is improved.    - apprec psych consult - dc haldol, cont thiamine, dc zyprexa, cont - depakote, start trazodone,   -Mental status currently stable     Hepatic encephalop discussing plan of care, discussing labs and imaging findings. Spoke with consultant. All questions answered.          12/3/2021

## 2021-12-04 PROCEDURE — 99232 SBSQ HOSP IP/OBS MODERATE 35: CPT | Performed by: HOSPITALIST

## 2021-12-04 NOTE — PLAN OF CARE
No acute changes. Denies pain. Resting comfortably. Anticipating discharge to [facility] once ins authorization received.      Problem: Patient Centered Care  Goal: Patient preferences are identified and integrated in the patient's plan of care  Description: Inter evaluate response  - Implement non-pharmacological measures as appropriate and evaluate response  - Consider cultural and social influences on pain and pain management  - Manage/alleviate anxiety  - Utilize distraction and/or relaxation techniques  - Monit managing their own health  - Refer to Case Management Department for coordinating discharge planning if the patient needs post-hospital services based on physician/LIP order or complex needs related to functional status, cognitive ability or social support

## 2021-12-04 NOTE — PROGRESS NOTES
[facility]    Progress Note    Corrinne Beath Patient Status:  Inpatient    1960 MRN [id number]   Location [facility] 5SW/SE Attending Ina Novoa, [address] [facility] Day # 40 PCP None Pcp       Subjective:   Corrinne Beath is a( (MIRALAX) powder packet 17 g, 17 g, Oral, Daily PRN  magnesium hydroxide (MILK OF MAGNESIA) 400 MG/5ML suspension 30 mL, 30 mL, Oral, Daily PRN  bisacodyl (DULCOLAX) rectal suppository 10 mg, 10 mg, Rectal, Daily PRN  Fleet Enema (FLEET) 7-19 GM/118ML enem Delirium tremens.  Severe. - Patient has had a fluctuating course but overall is improved.    - apprec psych consult - dc haldol, cont thiamine, dc zyprexa, cont - depakote, start trazodone,   -Mental status currently stable     Hepatic encephalop discussing plan of care, discussing labs and imaging findings. Spoke with consultant. All questions answered.          12/3/2021

## 2021-12-05 PROCEDURE — 99232 SBSQ HOSP IP/OBS MODERATE 35: CPT | Performed by: HOSPITALIST

## 2021-12-05 NOTE — PROGRESS NOTES
[facility]    Progress Note    Tawnyaallegra Posadas Patient Status:  Inpatient    1960 MRN [id number]   Location [facility] 5SW/SE Attending Yobany Tapia, [address] [facility] [address] [facility] [address] Day # 41 PCP None Pcp       Subjective:   Tawnya Posadas is a( powder packet 17 g, 17 g, Oral, Daily PRN  magnesium hydroxide (MILK OF MAGNESIA) 400 MG/5ML suspension 30 mL, 30 mL, Oral, Daily PRN  bisacodyl (DULCOLAX) rectal suppository 10 mg, 10 mg, Rectal, Daily PRN  Fleet Enema (FLEET) 7-19 GM/118ML enema 133 mL, Delirium tremens.  Severe. - Patient has had a fluctuating course but overall is improved.    - apprec psych consult - dc haldol, cont thiamine, dc zyprexa, cont - depakote, start trazodone,   -Mental status currently stable     Hepatic encephalopath plan of care, discussing labs and imaging findings. Spoke with consultant. All questions answered.

## 2021-12-05 NOTE — PLAN OF CARE
No acute changes. Remains NWB to RLE. Denies pain. Good appetite. Safety precautions in place.     Problem: Patient Centered Care  Goal: Patient preferences are identified and integrated in the patient's plan of care  Description: Interventions:  - What wou Implement non-pharmacological measures as appropriate and evaluate response  - Consider cultural and social influences on pain and pain management  - Manage/alleviate anxiety  - Utilize distraction and/or relaxation techniques  - Monitor for opioid side ef health  - Refer to Case Management Department for coordinating discharge planning if the patient needs post-hospital services based on physician/LIP order or complex needs related to functional status, cognitive ability or social support system  Outcome: P

## 2021-12-06 PROCEDURE — 99232 SBSQ HOSP IP/OBS MODERATE 35: CPT | Performed by: HOSPITALIST

## 2021-12-06 NOTE — PROGRESS NOTES
[place] [facility] - [facility]    Progress Note    Kasie Aguilar Patient Status:  Inpatient    1960 MRN [id number]   Location [facility] 5SW/SE Attending Jessika Garcia, [address] Day # 42 PCP None Pcp       Subjective:   Kasie Aguilar is a( powder packet 17 g, 17 g, Oral, Daily PRN  magnesium hydroxide (MILK OF MAGNESIA) 400 MG/5ML suspension 30 mL, 30 mL, Oral, Daily PRN  bisacodyl (DULCOLAX) rectal suppository 10 mg, 10 mg, Rectal, Daily PRN  Fleet Enema (FLEET) 7-19 GM/118ML enema 133 mL, Delirium tremens.  Severe. - Patient has had a fluctuating course but overall is improved.    - apprec psych consult - dc haldol, cont thiamine, dc zyprexa, cont - depakote, start trazodone,   -Mental status currently stable     Hepatic encephalopath plan of care, discussing labs and imaging findings. Spoke with consultant. All questions answered.

## 2021-12-06 NOTE — PHYSICAL THERAPY NOTE
PHYSICAL THERAPY TREATMENT NOTE - INPATIENT     Room Number: 970/660-A       Presenting Problem: Open ankle fx with I and D with External fixator on 10/25/21 .   Hospital course complicated by  Delirium / coagulopathy related to [facility] from EOB and toilet with RW and CGA, cues for hand placement and positioning of RLE to maintain NWB. Gait - Ambulates 15 ft, 80 ft x2 reps with seated rest between bouts, use of RW and CGA provided with one lateral LOB needing Min A for recovery.  P need...    Help from Another: Moving to and from a bed to a chair (including a wheelchair)?: A Little   Help from Another: Need to walk in hospital room?: A Little   Help from Another: Climbing 3-5 steps with a railing?: A Lot     AM-PAC Score:  Raw Score:

## 2021-12-06 NOTE — OCCUPATIONAL THERAPY NOTE
OCCUPATIONAL THERAPY TREATMENT NOTE - INPATIENT        Room Number: 629/390-A                Problem List  Principal Problem:    Bimalleolar fracture of right ankle, open type I or II, initial encounter  Active Problems:    Type I or II open bimalleolar fr with transfers and ambulation with RW for support. Winifrede Master DISCHARGE RECOMMENDATIONS  OT Discharge Recommendations: Sub-acute rehabilitation  OT Device Recommendations: Grab bars; Shower chair     PLAN  OT Treatment Plan: Balance activities; ADL training;Functi Extremity Dressing: min assist for RLE placement into pants    Education Provided: role of OT , LE dressing, standing tolerance, safety with transfers and ambulation with RW for support, NWB RLE   Patient End of Session: Up in chair; With [facility] staff;Needs met

## 2021-12-07 PROCEDURE — 99232 SBSQ HOSP IP/OBS MODERATE 35: CPT | Performed by: HOSPITALIST

## 2021-12-07 RX ORDER — HEPARIN SODIUM 1000 [USP'U]/ML
INJECTION, SOLUTION INTRAVENOUS; SUBCUTANEOUS
Status: COMPLETED
Start: 2021-12-07 | End: 2021-12-07

## 2021-12-07 NOTE — PLAN OF CARE
Problem: Patient Centered Care  Goal: Patient preferences are identified and integrated in the patient's plan of care  Description: Interventions:  - What would you like us to know as we care for you?  \"I want to know where my wife is\"  - Provide timely and social influences on pain and pain management  - Manage/alleviate anxiety  - Utilize distraction and/or relaxation techniques  - Monitor for opioid side effects  - Notify MD/LIP if interventions unsuccessful or patient reports new pain  - Anticipate in Arrange for needed discharge resources and transportation as appropriate  - Identify discharge learning needs (meds, wound care, etc)  - Arrange for interpreters to assist at discharge as needed  - Consider post-discharge preferences of patient/family/disc

## 2021-12-07 NOTE — PHYSICAL THERAPY NOTE
PHYSICAL THERAPY TREATMENT NOTE - INPATIENT     Room Number: 206/663-L       Presenting Problem: Open ankle fx with I and D with External fixator on 10/25/21 .   Hospital course complicated by  Delirium / coagulopathy related to [facility] able to maintain NWB mostly in RLE)  Stoop/Curb Assistance: Not tested    Patient was left in bedside chair and alarm activated at end of session with all needs in reach.  Patient does not have the physical skills to return to prior living environment upon Patient Difficulty: Moving from lying on back to sitting on the side of the bed?: None   How much help from another person does the patient currently need. ..    Help from Another: Moving to and from a bed to a chair (including a wheelchair)?: A Little   H

## 2021-12-07 NOTE — PLAN OF CARE
Received patient in stable condition with admitting diagnosis of Right ankle FX from a fall. Pt. alert and oriented x 2-3, w/ poor safety judgement and impulsiveness. PMH of Alcohol abuse, liver cirrhosis, Jaundice. No Telemetry in place.  Sometimes, incont patient and family on plan of care  - See additional Care Plan goals for specific interventions  Outcome: Progressing     Problem: PAIN - ADULT  Goal: Verbalizes/displays adequate comfort level or patient's stated pain goal  Description: INTERVENTIONS:  - Consider post-discharge preferences of patient/family/discharge partner  - Complete POLST form as appropriate  - Assess patient's ability to be responsible for managing their own health  - Refer to Case Management Department for coordinating discharge plan

## 2021-12-07 NOTE — PROGRESS NOTES
[place] [facility] - [facility]    Progress Note    Imani Spears Patient Status:  Inpatient    1960 MRN [id number]   Location [facility] 5SW/SE Attending Carmine Elmore, [address] Day # 43 PCP None Pcp       Subjective:   Imani Spears is a( powder packet 17 g, 17 g, Oral, Daily PRN  magnesium hydroxide (MILK OF MAGNESIA) 400 MG/5ML suspension 30 mL, 30 mL, Oral, Daily PRN  bisacodyl (DULCOLAX) rectal suppository 10 mg, 10 mg, Rectal, Daily PRN  Fleet Enema (FLEET) 7-19 GM/118ML enema 133 mL, fluctuating course but overall is improved.    - apprec psych consult - dc haldol, cont thiamine, dc zyprexa, cont - depakote, start trazodone,   -Mental status currently stable     Hepatic encephalopathy - improved   - apprec GI consult --lactulose 20 g or Spoke with consultant. All questions answered.

## 2021-12-07 NOTE — DIETARY NOTE
ADULT NUTRITION REASSESSMENT    Pt is at moderate nutrition risk. Pt does not meet malnutrition criteria.       RECOMMENDATIONS TO MD: None at this time    ADMITTING DIAGNOSIS: Type I or II open bimalleolar fracture of left ankle, initial encounter [S82.84 for temporary TF until pt mental status improves and PO intake improves. MD and GI agreeable. DHT placed and placement confirmed per RN. TF order as below. Pt is at refeeding risk. TF to start low and increase slowly.    Update 11/26/21:  Pt seen for follow CREATSERUM, CA, MG, NA, K, CL, CO2, PHOS, OSMOCALC in the last 84 hours.   GASTROINTESTINAL: +BM 12/7    NUTRITION RELATED PHYSICAL FINDINGS:  - Nutrition Focused Physical Exam (NFPE): Nutrition Focused Physical Exam (NFPE): mostly nourished, some areas stephanie each) Chocolate Daily. Rational/use of oral supplements discussed.   - Vitamin and mineral supplements: multivitamin/mineral, thiamin and folic acid/ MD  - Feeding assistance: meal set up  - Nutrition education: not appropriate at this time  - Coordination

## 2021-12-08 PROCEDURE — 99233 SBSQ HOSP IP/OBS HIGH 50: CPT | Performed by: HOSPITALIST

## 2021-12-08 NOTE — PROGRESS NOTES
[facility] [place] [facility]    Progress Note    Tawnya Posadas Patient Status:  Inpatient    1960 MRN [id number]   Location [facility] 5SW/SE Attending Kristen Haqissa Day # 40 PCP None Pcp     Subjective:     Constitutiona 11/30/2021    PTT 40.4 (H) 10/28/2021    INR 2.12 (H) 11/24/2021    MG 2.0 12/03/2021    PHOS 3.5 12/03/2021    TROP 0.061 (HH) 10/28/2021    B12 1,551 (H) 10/28/2021       No results found. Assessment & Plan:            Delirium tremens.  Severe. cleared for DC  -We will continue to monitor closely  -Appreciate recommendations        38 min spent on pt of which 25 min spent coordinating care with nurse and counseling pt about ins issues      Griselda Khan MD  12/8/2021

## 2021-12-08 NOTE — PLAN OF CARE
Received patient in stable condition with admitting diagnosis of Right ankle FX from a fall. Pt. alert and oriented x 3, Tribe, mainly Armenian speaking.  Safety judgement improved significantly and patient following safety and fall precautions more carefully restraints    Interventions:   - Monitor vital signs  - Monitor appropriate labs  - Fall precautions  - Administer medications per order  - Follow MD orders  - Psychiatry consult  - Diagnostics per order  - Update / inform patient and family on plan of car schedule  Outcome: Progressing     Problem: DISCHARGE PLANNING  Goal: Discharge to home or other facility with appropriate resources  Description: INTERVENTIONS:  - Identify barriers to discharge w/pt and caregiver  - Include patient/family/discharge partn

## 2021-12-08 NOTE — PROGRESS NOTES
[facility]    Progress Note    Samreen Wade Patient Status:  Inpatient    1960 MRN [id number]   Location [facility] 5SW/SE Attending Kristen Haq Day # 40 PCP None Pcp        Subjective:   Samreen Wade Bimalleolar fracture of right ankle, open type I or II, initial encounter  Status post exfix removal and ORIF, delayed surgery secondary to severe alcohol withdrawal.  Continue nonweightbearing right lower extremity until 6 weeks postop.   Patient will need

## 2021-12-09 PROCEDURE — 99233 SBSQ HOSP IP/OBS HIGH 50: CPT | Performed by: HOSPITALIST

## 2021-12-09 RX ORDER — MAGNESIUM OXIDE 400 MG (241.3 MG MAGNESIUM) TABLET
400 TABLET ONCE
Status: COMPLETED | OUTPATIENT
Start: 2021-12-09 | End: 2021-12-09

## 2021-12-09 NOTE — BH PROGRESS NOTE
Patient provided information for [facility] for substance abuse based on his current status as self-pay.     Gali Abarca Liaison, [id number]

## 2021-12-09 NOTE — PHYSICAL THERAPY NOTE
PHYSICAL THERAPY TREATMENT NOTE - INPATIENT     Room Number: 371/506-D       Presenting Problem: Open ankle fx with I and D with External fixator on 10/25/21 .   Hospital course complicated by  Delirium / coagulopathy related to [facility] Pt ambulates 60 ft x2 bouts with RW and CGA to occasional Min A due to unsteadiness. Frequent cues to avoid WB of RLE and to slow speed for improved safety.      The patient's Approx Degree of Impairment: 35.83% has been calculated based on documentation in railing?: A Little     AM-PAC Score:  Raw Score: 20   Approx Degree of Impairment: 35.83%   Standardized Score (AM-PAC Scale): 47.67   CMS Modifier (G-Code): CJ    FUNCTIONAL ABILITY STATUS  Functional Mobility/Gait Assessment  Gait Assistance: Contact ta

## 2021-12-09 NOTE — PROGRESS NOTES
12/09/21 1107   Wound 11/10/21 Incision Ankle Right;Lateral   Date First Assessed/Time First Assessed: 11/10/21 1427   Primary Wound Type:  Incision  Location: Ankle  Wound Location Orientation: Right;Lateral  Wound Description (Comments): 2 open areas

## 2021-12-09 NOTE — PROGRESS NOTES
[facility]    Progress Note    Renay Oconnell Patient Status:  Inpatient    1960 MRN [id number]   Location [facility] 5SW/SE Attending Kristen Haq Day # 39 PCP None Pcp     Subjective:     Constitutiona 12/09/2021    PTT 40.4 (H) 10/28/2021    INR 2.12 (H) 11/24/2021    MG 1.8 12/09/2021    PHOS 3.5 12/09/2021    TROP 0.061 (HH) 10/28/2021    B12 1,551 (H) 10/28/2021       No results found. Assessment & Plan:            Delirium tremens.  Severe. cleared for DC  -We will continue to monitor closely  -Appreciate recommendations        36 min spent on pt of which 20 min spent coordinating care with nurse/case management and counseling pt about ins issues      Patria Mccloud MD  12/8/2021

## 2021-12-09 NOTE — PLAN OF CARE
Problem: PAIN - ADULT  Goal: Verbalizes/displays adequate comfort level or patient's stated pain goal  Description: INTERVENTIONS:  - Encourage pt to monitor pain and request assistance  - Assess pain using appropriate pain scale  - Administer analgesics i.e. lights off, TV off, minimize noise and interruptions  - Encourage family to assist in orientation and promotion of home routines  Outcome: Progressing     Vital signs stable on room air. Patient denies pain at this time.  CAM boot in place on Right low

## 2021-12-10 VITALS
OXYGEN SATURATION: 100 % | WEIGHT: 175.13 LBS | HEART RATE: 72 BPM | DIASTOLIC BLOOD PRESSURE: 61 MMHG | BODY MASS INDEX: 25.07 KG/M2 | RESPIRATION RATE: 18 BRPM | SYSTOLIC BLOOD PRESSURE: 110 MMHG | HEIGHT: 70 IN | TEMPERATURE: 98 F

## 2021-12-10 PROCEDURE — 99239 HOSP IP/OBS DSCHRG MGMT >30: CPT | Performed by: HOSPITALIST

## 2021-12-10 RX ORDER — MELATONIN
100 DAILY
Qty: 30 TABLET | Refills: 0 | Status: SHIPPED | OUTPATIENT
Start: 2021-12-11

## 2021-12-10 RX ORDER — FOLIC ACID 1 MG/1
1 TABLET ORAL DAILY
Qty: 30 TABLET | Refills: 0 | Status: SHIPPED | OUTPATIENT
Start: 2021-12-11

## 2021-12-10 RX ORDER — TRAZODONE HYDROCHLORIDE 50 MG/1
50 TABLET ORAL NIGHTLY
Qty: 30 TABLET | Refills: 0 | Status: SHIPPED | OUTPATIENT
Start: 2021-12-10

## 2021-12-10 RX ORDER — RISPERIDONE 1 MG/1
1 TABLET, FILM COATED ORAL 2 TIMES DAILY
Qty: 60 TABLET | Refills: 0 | Status: SHIPPED | OUTPATIENT
Start: 2021-12-10

## 2021-12-10 RX ORDER — MULTIPLE VITAMINS W/ MINERALS TAB 9MG-400MCG
1 TAB ORAL DAILY
Qty: 30 TABLET | Refills: 0 | Status: SHIPPED | OUTPATIENT
Start: 2021-12-11

## 2021-12-10 RX ORDER — DIVALPROEX SODIUM 250 MG/1
250 TABLET, DELAYED RELEASE ORAL 2 TIMES DAILY
Qty: 60 TABLET | Refills: 0 | Status: SHIPPED | OUTPATIENT
Start: 2021-12-10

## 2021-12-10 NOTE — PLAN OF CARE
Problem: PAIN - ADULT  Goal: Verbalizes/displays adequate comfort level or patient's stated pain goal  Description: INTERVENTIONS:  - Encourage pt to monitor pain and request assistance  - Assess pain using appropriate pain scale  - Administer analgesics i.e. lights off, TV off, minimize noise and interruptions  - Encourage family to assist in orientation and promotion of home routines  Outcome: Progressing     Vital signs stable on room air. Patient denies pain at this time.    Safety precautions in place,

## 2021-12-10 NOTE — PLAN OF CARE
Problem: Patient Centered Care  Goal: Patient preferences are identified and integrated in the patient's plan of care  Description: Interventions:  - What would you like us to know as we care for you?  \"I want to know where my wife is\"  - Provide timely evaluate response  - Consider cultural and social influences on pain and pain management  - Manage/alleviate anxiety  - Utilize distraction and/or relaxation techniques  - Monitor for opioid side effects  - Notify MD/LIP if interventions unsuccessful or pa Management Department for coordinating discharge planning if the patient needs post-hospital services based on physician/LIP order or complex needs related to functional status, cognitive ability or social support system  Outcome: Adequate for Discharge

## 2021-12-10 NOTE — PHYSICAL THERAPY NOTE
PHYSICAL THERAPY TREATMENT NOTE - INPATIENT     Room Number: 461/307-Z       Presenting Problem: Open ankle fx with I and D with External fixator on 10/25/21 .   Hospital course complicated by  Delirium / coagulopathy related [facility] Provided handout to pt with lending closets to assist with obtaining a w/c. Wife verbalized understanding of education. Wife concerned regarding cost of pt's medications and insurance coverage, deferred to CM.  CM notified and to address medication cost wit currently need. ..    Help from Another: Moving to and from a bed to a chair (including a wheelchair)?: A Little   Help from Another: Need to walk in hospital room?: A Little   Help from Another: Climbing 3-5 steps with a railing?: A Little     AM-PAC Score:

## 2021-12-10 NOTE — DIETARY NOTE
ADULT NUTRITION REASSESSMENT    Pt is at moderate nutrition risk. Pt does not meet malnutrition criteria.       RECOMMENDATIONS TO MD: None at this time    ADMITTING DIAGNOSIS: Type I or II open bimalleolar fracture of left ankle, initial encounter [S82.84 for temporary TF until pt mental status improves and PO intake improves. MD and GI agreeable. DHT placed and placement confirmed per RN. TF order as below. Pt is at refeeding risk. TF to start low and increase slowly.    Update 11/26/21:  Pt seen for follow pt refused at 12/09/21 1400        Food Allergies: No Known Food Allergies (NKFA)  Cultural/Ethnic/Scientology Preferences: None    MEDICATIONS: reviewed.  Receiving Lactulose    • risperiDONE  1 mg Oral BID   • traZODone  50 mg Oral Nightly   • lactulose  20 oz)    ESTIMATED NUTRITION NEEDS: Dosing wt: 76kg   Calories: 5673-4314 calories/day (25-30 calories per kg dosing wt )  Did not adjust, continue to use 76 kg dosing wt.    Protein: 91 -99 grams protein/day (1.2-1.3 grams protein per kg dosing wt)    NUTRIT

## 2021-12-10 NOTE — CM/SW NOTE
CM met with pt at bedside to discuss dc plan. Pt provided this CM with copy of his ins card. Pt states that he does not feel safe going home, wife has back problems and unable to care for him.    CM encouraged pt to develop a backup plan as this CM feels

## 2021-12-10 NOTE — DISCHARGE SUMMARY
Dc summary#[id number]  > 30 min spent on [address] Discharge Diagnoses: etoh wd; leg fx    Lace+ Score: 75  59-90 High Risk  29-58 Medium Risk  0-28   Low Risk. TCM Follow-Up Recommendation:  LACE > 58:  High Risk of readmission after discharge from t

## 2021-12-10 NOTE — CM/SW NOTE
12/10/21 1300   Discharge disposition   Expected discharge disposition Home or Self   Outpatient services Outpatient rehab services   Discharge transportation [address] [facility]     Pt discussed during nursing rounds. Pt is stable for anton today.  MD walton orde

## 2021-12-10 NOTE — OCCUPATIONAL THERAPY NOTE
OCCUPATIONAL THERAPY TREATMENT NOTE - INPATIENT    Room Number: 956/703-A               Problem List  Principal Problem:    Bimalleolar fracture of right ankle, open type I or II, initial encounter  Active Problems:    Type I or II open bimalleolar fractur RECOMMENDATIONS  OT Discharge Recommendations: 24 hour care/supervision;Home  OT Device Recommendations: Grab bars; Shower chair    PLAN  OT Treatment Plan: Balance activities; ADL training;Functional transfer training; Endurance training    SUBJECTIVE  Pt se pants over CAM boot    Education Provided: role of OT, LE dressing, NWB status RLE, standing tolerance, safety with transfers and ambulation with RW , car transfers  Patient End of Session: Up in chair;Needs met;Call light within reach;RN aware of session/

## 2021-12-13 NOTE — DISCHARGE SUMMARY
[facility]    PATIENT'S NAME: GERANANCY   ATTENDING PHYSICIAN: Wilfredo Haq MD   PATIENT ACCOUNT#:   [id number]    LOCATION:  [address] [place] [address] 41 RECORD #:   [id number]       YOB: 1960  ADMISSION DATE:       10/25/ was made to try to find a rehab facility. The [facility] was identified, but there was an issue of whether his insurance or [address] [place] [address] would be responsible for the costs.   There was a significant many-day delay discharge related to this dysfunction. Always got FFP before surgery. 6.   Anemia and hypertension. Currently stable. 7.   Urinary tract infection. Status post Cipro. 8.   Elevated troponins of unclear significance.   Perhaps related to tachycardia from withdrawal.  9.   Cirrh with Dr. Julián Mauricio if he needs a new one. RISK OF READMISSION:  Very high. TCM followup is recommended. Dictated By Irina Leon.  MD Severino  d: 12/12/2021 08:38:26  t: 12/13/2021 09:31:55  Job [id number]  LAS/

## 2024-03-19 NOTE — OCCUPATIONAL THERAPY NOTE
Apparently they were able to  Zenpep per Arvini's chart note. -kg   OCCUPATIONAL THERAPY TREATMENT NOTE - INPATIENT        Room Number: 218/218-A                Problem List  Principal Problem:    Bimalleolar fracture of right ankle, open type I or II, initial encounter  Active Problems:    Type I or II open bimalleolar fr activities; ADL training;Functional transfer training;UE strengthening/ROM; Cognitive reorientation;Patient/Family education;Patient/Family training    SUBJECTIVE  \"I stand? \"    OBJECTIVE  Precautions: Limb alert - right;Bed/chair alarm;Cardiac (external f care task at sink level with SBA    Comment: ongoing    Patient will independently adhere to NWB restrictions during  Daily activities   Comment: n/a            Goals  on:  Frequency:3-5x week    Elliot Given   Occupational Therapist  Tania

## (undated) DEVICE — UNDYED BRAIDED (POLYGLACTIN 910), SYNTHETIC ABSORBABLE SUTURE: Brand: COATED VICRYL

## (undated) DEVICE — SUTURE MONOCRYL 2-0 SH

## (undated) DEVICE — SOL H2O 1000ML BTL

## (undated) DEVICE — GAMMEX® NON-LATEX PI ORTHO SIZE 7.5, STERILE POLYISOPRENE POWDER-FREE SURGICAL GLOVE: Brand: GAMMEX

## (undated) DEVICE — LOWER EXTREMITY: Brand: MEDLINE INDUSTRIES, INC.

## (undated) DEVICE — BIT DRL 140/115MM 2MM 3 FLT 2

## (undated) DEVICE — SUTURE ETHIBOND 0 CT-1

## (undated) DEVICE — PROXIMATE SKIN STAPLERS (35 WIDE) CONTAINS 35 STAINLESS STEEL STAPLES (FIXED HEAD): Brand: PROXIMATE

## (undated) DEVICE — GAMMEX® PI HYBRID SIZE 7.5, STERILE POWDER-FREE SURGICAL GLOVE, POLYISOPRENE AND NEOPRENE BLEND: Brand: GAMMEX

## (undated) DEVICE — C-ARM: Brand: UNBRANDED

## (undated) DEVICE — ABDOMINAL PAD: Brand: CURITY

## (undated) DEVICE — DISPOSABLE TOURNIQUET CUFF SINGLE BLADDER, DUAL PORT AND QUICK CONNECT CONNECTOR: Brand: COLOR CUFF

## (undated) DEVICE — SUCTION CANISTER, 3000CC,SAFELINER: Brand: DEROYAL

## (undated) DEVICE — INTENDED FOR TISSUE SEPARATION, AND OTHER PROCEDURES THAT REQUIRE A SHARP SURGICAL BLADE TO PUNCTURE OR CUT.: Brand: BARD-PARKER ® STAINLESS STEEL BLADES

## (undated) DEVICE — SUTURE VICRYL 2-0 FS-1

## (undated) DEVICE — BANDAGE ROLL,100% COTTON, 6 PLY, LARGE: Brand: KERLIX

## (undated) DEVICE — SUTURE PROLENE 3-0 8687H

## (undated) DEVICE — TRAY SKIN PREP PVP-1

## (undated) DEVICE — COVER SLV UNV DISP NTR STRL LF

## (undated) DEVICE — DRAPE SHEET LG

## (undated) DEVICE — CHLORAPREP 26ML APPLICATOR

## (undated) DEVICE — CLIPPER BLADE 3M

## (undated) DEVICE — DRILL BIT SYNT 2.5X110 310.25

## (undated) DEVICE — STERILE TETRA-FLEX CF, ELASTIC BANDAGE, 2" X 5.5YD: Brand: TETRA-FLEX™CF

## (undated) DEVICE — DRAPE SRG 70X60IN SPLT U IMPRV

## (undated) DEVICE — SOL  .9 1000ML BTL

## (undated) DEVICE — PADDING 4YDX6IN CTTN STRL WBRL

## (undated) DEVICE — BANDAGE ROLL,100% COTTON, 6 PLY, SMALL: Brand: KERLIX

## (undated) DEVICE — WIRE K SYNT 2.0 292.20: Type: IMPLANTABLE DEVICE

## (undated) DEVICE — SPLINT PRECUT SYNTH 5X30

## (undated) DEVICE — SUTURE VICRYL 0 J340H

## (undated) DEVICE — OCCLUSIVE GAUZE STRIP,3% BISMUTH TRIBROMOPHENATE IN PETROLATUM BLEND: Brand: XEROFORM

## (undated) DEVICE — GAMMEX® PI HYBRID SIZE 8, STERILE POWDER-FREE SURGICAL GLOVE, POLYISOPRENE AND NEOPRENE BLEND: Brand: GAMMEX

## (undated) NOTE — LETTER
[address] [place] [address]      Authorization for Surgical Operation and Procedure     Date:                                                                                                         Time:__________  1.    I occur: fever and allergic reactions, hemolytic reactions, transmission of diseases such as Hepatitis, AIDS and Cytomegalovirus (CMV) and fluid overload.   In the event that I wish to have an autologous transfusion of my own blood, or a directed donor transf applicable recovery period ends for purposes of reinstating the DNAR order.   10. Patients having a sterilization procedure: I understand that if the procedure is successful the results will be permanent and it will therefore be impossible for me to insemin _______________________________________________________________ _____________________________  Santhosh Zamora Physician)                                                                                         (Date)                                   (Time

## (undated) NOTE — LETTER
[facility]  Administration of Anesthesia  1. Teresa Hiss, or _________________________________ acting on his behalf, (Patient) (Dependent/Representative) request to receive anesthesia for my pending procedure/operation/treatment.   A bleeding, seizure, cardiac arrest and death. 7. AWARENESS: I understand that it is possible (but unlikely) to have explicit memory of events from the operating room while under general anesthesia.   8. ELECTROCONVULSIVE THERAPY PATIENTS: This consent serve below affirms that prior to the time of the procedure, I have explained to the patient and/or his/her guardian, the risks and benefits of undergoing anesthesia, as well as any reasonable alternatives.     ___________________________________________________

## (undated) NOTE — LETTER
[address] [place] [address]      Authorization for Surgical Operation and Procedure     Date:___________                                                                                                         Time:_______ following are some, but not all, of the potential risks that can occur: fever and allergic reactions, hemolytic reactions, transmission of diseases such as Hepatitis, AIDS and Cytomegalovirus (CMV) and fluid overload.   In the event that I wish to have an a The surgeon or my attending physician will determine when the applicable recovery period ends for purposes of reinstating the DNAR order.   10. Patients having a sterilization procedure: I understand that if the procedure is successful the results will be p with my patient.     _______________________________________________________________ _____________________________  Barry Duarteking of Physician)                                                                                         (Date)

## (undated) NOTE — IP AVS SNAPSHOT
[facility]            (For Outpatient Use Only) Initial Admit Date: 10/25/2021   Inpt/Obs Admit Date: Inpt: 10/25/21 / Obs: N/A   Discharge Date:    Vickie Redding:  [de-identified]   MRN: [de-identified]   CSN: [id number]   CEID: [id number]        E Type:    Subscriber Name:  Subscriber :    Subscriber ID:  Pt Rel to Subscriber:    Hospital Account Financial Class: [facility]    December 10, 2021